# Patient Record
Sex: MALE | Race: BLACK OR AFRICAN AMERICAN | NOT HISPANIC OR LATINO | Employment: OTHER | URBAN - METROPOLITAN AREA
[De-identification: names, ages, dates, MRNs, and addresses within clinical notes are randomized per-mention and may not be internally consistent; named-entity substitution may affect disease eponyms.]

---

## 2017-04-01 ENCOUNTER — LAB VISIT (OUTPATIENT)
Dept: LAB | Facility: HOSPITAL | Age: 52
End: 2017-04-01
Attending: INTERNAL MEDICINE
Payer: COMMERCIAL

## 2017-04-01 DIAGNOSIS — E78.5 HYPERLIPIDEMIA, UNSPECIFIED: ICD-10-CM

## 2017-04-01 DIAGNOSIS — Z11.59 NEED FOR HEPATITIS C SCREENING TEST: ICD-10-CM

## 2017-04-01 DIAGNOSIS — E55.9 VITAMIN D DEFICIENCY: ICD-10-CM

## 2017-04-01 DIAGNOSIS — Z00.00 ROUTINE GENERAL MEDICAL EXAMINATION AT A HEALTH CARE FACILITY: ICD-10-CM

## 2017-04-01 LAB
ALBUMIN SERPL BCP-MCNC: 4.1 G/DL
ALP SERPL-CCNC: 45 U/L
ALT SERPL W/O P-5'-P-CCNC: 14 U/L
ANION GAP SERPL CALC-SCNC: 8 MMOL/L
AST SERPL-CCNC: 18 U/L
BASOPHILS # BLD AUTO: 0.01 K/UL
BASOPHILS NFR BLD: 0.2 %
BILIRUB SERPL-MCNC: 0.7 MG/DL
BUN SERPL-MCNC: 12 MG/DL
CALCIUM SERPL-MCNC: 9.9 MG/DL
CHLORIDE SERPL-SCNC: 104 MMOL/L
CHOLEST/HDLC SERPL: 3.7 {RATIO}
CO2 SERPL-SCNC: 28 MMOL/L
COMPLEXED PSA SERPL-MCNC: 2.7 NG/ML
CREAT SERPL-MCNC: 1.1 MG/DL
DIFFERENTIAL METHOD: ABNORMAL
EOSINOPHIL # BLD AUTO: 0.1 K/UL
EOSINOPHIL NFR BLD: 2 %
ERYTHROCYTE [DISTWIDTH] IN BLOOD BY AUTOMATED COUNT: 13.6 %
EST. GFR  (AFRICAN AMERICAN): >60 ML/MIN/1.73 M^2
EST. GFR  (NON AFRICAN AMERICAN): >60 ML/MIN/1.73 M^2
GLUCOSE SERPL-MCNC: 81 MG/DL
HCT VFR BLD AUTO: 43.1 %
HDL/CHOLESTEROL RATIO: 26.9 %
HDLC SERPL-MCNC: 193 MG/DL
HDLC SERPL-MCNC: 52 MG/DL
HGB BLD-MCNC: 14.4 G/DL
LDLC SERPL CALC-MCNC: 130 MG/DL
LYMPHOCYTES # BLD AUTO: 1.5 K/UL
LYMPHOCYTES NFR BLD: 33.6 %
MCH RBC QN AUTO: 28.7 PG
MCHC RBC AUTO-ENTMCNC: 33.4 %
MCV RBC AUTO: 86 FL
MONOCYTES # BLD AUTO: 0.4 K/UL
MONOCYTES NFR BLD: 9 %
NEUTROPHILS # BLD AUTO: 2.5 K/UL
NEUTROPHILS NFR BLD: 55.2 %
NONHDLC SERPL-MCNC: 141 MG/DL
PLATELET # BLD AUTO: 199 K/UL
PMV BLD AUTO: 9.1 FL
POTASSIUM SERPL-SCNC: 4.9 MMOL/L
PROT SERPL-MCNC: 7.8 G/DL
RBC # BLD AUTO: 5.02 M/UL
SODIUM SERPL-SCNC: 140 MMOL/L
TRIGL SERPL-MCNC: 55 MG/DL
TSH SERPL DL<=0.005 MIU/L-ACNC: 1.56 UIU/ML
WBC # BLD AUTO: 4.46 K/UL

## 2017-04-01 PROCEDURE — 36415 COLL VENOUS BLD VENIPUNCTURE: CPT | Mod: PO

## 2017-04-01 PROCEDURE — 80053 COMPREHEN METABOLIC PANEL: CPT

## 2017-04-01 PROCEDURE — 86803 HEPATITIS C AB TEST: CPT

## 2017-04-01 PROCEDURE — 82306 VITAMIN D 25 HYDROXY: CPT

## 2017-04-01 PROCEDURE — 85025 COMPLETE CBC W/AUTO DIFF WBC: CPT

## 2017-04-01 PROCEDURE — 84443 ASSAY THYROID STIM HORMONE: CPT

## 2017-04-01 PROCEDURE — 80061 LIPID PANEL: CPT

## 2017-04-01 PROCEDURE — 84153 ASSAY OF PSA TOTAL: CPT

## 2017-04-03 LAB — HCV AB SERPL QL IA: NEGATIVE

## 2017-04-04 ENCOUNTER — OFFICE VISIT (OUTPATIENT)
Dept: INTERNAL MEDICINE | Facility: CLINIC | Age: 52
End: 2017-04-04
Payer: COMMERCIAL

## 2017-04-04 VITALS
BODY MASS INDEX: 31.97 KG/M2 | DIASTOLIC BLOOD PRESSURE: 76 MMHG | RESPIRATION RATE: 16 BRPM | TEMPERATURE: 99 F | SYSTOLIC BLOOD PRESSURE: 118 MMHG | HEIGHT: 71 IN | WEIGHT: 228.38 LBS | HEART RATE: 84 BPM

## 2017-04-04 DIAGNOSIS — E55.9 VITAMIN D DEFICIENCY: ICD-10-CM

## 2017-04-04 DIAGNOSIS — R97.20 RISING PSA LEVEL: ICD-10-CM

## 2017-04-04 DIAGNOSIS — E78.5 HYPERLIPIDEMIA, UNSPECIFIED HYPERLIPIDEMIA TYPE: ICD-10-CM

## 2017-04-04 DIAGNOSIS — J31.0 CHRONIC RHINITIS: ICD-10-CM

## 2017-04-04 DIAGNOSIS — Z00.00 WELL ADULT EXAM: Primary | ICD-10-CM

## 2017-04-04 LAB — 25(OH)D3+25(OH)D2 SERPL-MCNC: 35 NG/ML

## 2017-04-04 PROCEDURE — 99999 PR PBB SHADOW E&M-EST. PATIENT-LVL III: CPT | Mod: PBBFAC,,, | Performed by: INTERNAL MEDICINE

## 2017-04-04 PROCEDURE — 99396 PREV VISIT EST AGE 40-64: CPT | Mod: S$GLB,,, | Performed by: INTERNAL MEDICINE

## 2017-04-04 NOTE — PROGRESS NOTES
Subjective:       Patient ID: Linda Mobley is a 52 y.o. male.    Chief Complaint: Annual Exam    HPI   The patient presents for annual physical examination follow-up.  He has dyslipidemia and chronic rhinitis.  He is doing well.  He has not experience any recent episodes of lower back pain.  He is resting well at night.  He has not been exercising regularly.  No exertional chest pain or dyspnea is noted.  Review of Systems   Constitutional: Negative for activity change, appetite change, chills, fatigue, fever and unexpected weight change.   HENT: Negative for congestion, ear pain, nosebleeds and postnasal drip.    Eyes: Negative for pain, redness, itching and visual disturbance.   Respiratory: Negative for cough, chest tightness, shortness of breath and wheezing.    Cardiovascular: Negative for chest pain, palpitations and leg swelling.   Gastrointestinal: Negative for abdominal pain, blood in stool, constipation, nausea and vomiting.   Genitourinary: Positive for frequency (chronic daytime frequency noted.). Negative for difficulty urinating, dysuria, hematuria and urgency.        No nocturia noted.   Musculoskeletal: Negative for arthralgias, back pain, gait problem, joint swelling, myalgias, neck pain and neck stiffness.   Skin: Negative for color change and rash.   Neurological: Negative for dizziness, seizures, syncope, weakness, light-headedness, numbness and headaches.   Hematological: Does not bruise/bleed easily.   Psychiatric/Behavioral: Negative for agitation, confusion, hallucinations and sleep disturbance. The patient is not nervous/anxious.        Objective:      Physical Exam   Constitutional: He is oriented to person, place, and time. He appears well-developed and well-nourished. No distress.   HENT:   Head: Normocephalic and atraumatic.   Right Ear: External ear normal.   Left Ear: External ear normal.   Mouth/Throat: Oropharynx is clear and moist. No oropharyngeal exudate.   Eyes: Conjunctivae  and EOM are normal. Pupils are equal, round, and reactive to light. No scleral icterus.   Neck: Normal range of motion. Neck supple. No JVD present. No thyromegaly present.   Cardiovascular: Normal rate, regular rhythm, normal heart sounds and intact distal pulses.  Exam reveals no gallop and no friction rub.    No murmur heard.  Pulmonary/Chest: Effort normal and breath sounds normal. No respiratory distress. He has no wheezes. He has no rales.   Abdominal: Soft. Bowel sounds are normal. He exhibits no mass. There is no tenderness.   Genitourinary: Penis normal.   Genitourinary Comments:   No inguinal hernia.  No testicular masses.   Musculoskeletal: Normal range of motion. He exhibits no edema or tenderness.   Lymphadenopathy:     He has no cervical adenopathy.   Neurological: He is alert and oriented to person, place, and time. No cranial nerve deficit. He exhibits normal muscle tone.   Skin: Skin is warm and dry. No rash noted.   Psychiatric: He has a normal mood and affect. His behavior is normal.   Nursing note and vitals reviewed.      Results for orders placed or performed in visit on 04/01/17   Urinalysis   Result Value Ref Range    Specimen UA Urine, Clean Catch     Color, UA Yellow Yellow, Straw, Jennifer    Appearance, UA Clear Clear    pH, UA 5.0 5.0 - 8.0    Specific Gravity, UA 1.025 1.005 - 1.030    Protein, UA Negative Negative    Glucose, UA Negative Negative    Ketones, UA Negative Negative    Bilirubin (UA) Negative Negative    Occult Blood UA 1+ (A) Negative    Nitrite, UA Negative Negative    Urobilinogen, UA 2.0-3.0 (A) <2.0 EU/dL    Leukocytes, UA Negative Negative   Urinalysis Microscopic   Result Value Ref Range    RBC, UA 3 0 - 4 /hpf    WBC, UA 5 0 - 5 /hpf    Squam Epithel, UA 0 /hpf    Microscopic Comment SEE COMMENT      Other labs reviewed.  Assessment:       1. Well adult exam    2. Hyperlipidemia, unspecified hyperlipidemia type    3. Chronic rhinitis    4. Vitamin D deficiency    5.  Rising PSA level        Plan:       Linda was seen today for annual exam.  Lipid profile and diagnostic PSA level will be obtained in 5 months for monitoring.  The patient has been encouraged to decrease fat intake in the diet.  The patient is to return to clinic annually and as needed.    Diagnoses and all orders for this visit:    Well adult exam    Hyperlipidemia, unspecified hyperlipidemia type    Chronic rhinitis    Vitamin D deficiency

## 2017-09-01 ENCOUNTER — LAB VISIT (OUTPATIENT)
Dept: LAB | Facility: HOSPITAL | Age: 52
End: 2017-09-01
Attending: INTERNAL MEDICINE
Payer: COMMERCIAL

## 2017-09-01 DIAGNOSIS — R97.20 RISING PSA LEVEL: ICD-10-CM

## 2017-09-01 DIAGNOSIS — E78.5 HYPERLIPIDEMIA, UNSPECIFIED HYPERLIPIDEMIA TYPE: ICD-10-CM

## 2017-09-01 LAB
CHOLEST SERPL-MCNC: 176 MG/DL
CHOLEST/HDLC SERPL: 3.6 {RATIO}
COMPLEXED PSA SERPL-MCNC: 2 NG/ML
HDLC SERPL-MCNC: 49 MG/DL
HDLC SERPL: 27.8 %
LDLC SERPL CALC-MCNC: 116.8 MG/DL
NONHDLC SERPL-MCNC: 127 MG/DL
TRIGL SERPL-MCNC: 51 MG/DL

## 2017-09-01 PROCEDURE — 84153 ASSAY OF PSA TOTAL: CPT

## 2017-09-01 PROCEDURE — 80061 LIPID PANEL: CPT

## 2017-09-01 PROCEDURE — 36415 COLL VENOUS BLD VENIPUNCTURE: CPT | Mod: PO

## 2017-09-04 ENCOUNTER — PATIENT MESSAGE (OUTPATIENT)
Dept: INTERNAL MEDICINE | Facility: CLINIC | Age: 52
End: 2017-09-04

## 2018-05-07 ENCOUNTER — TELEPHONE (OUTPATIENT)
Dept: INTERNAL MEDICINE | Facility: CLINIC | Age: 53
End: 2018-05-07

## 2018-05-07 DIAGNOSIS — Z00.00 ROUTINE GENERAL MEDICAL EXAMINATION AT A HEALTH CARE FACILITY: Primary | ICD-10-CM

## 2018-05-07 NOTE — TELEPHONE ENCOUNTER
----- Message from Jayne Mcknight sent at 5/7/2018 10:44 AM CDT -----  Contact: Pt [phone]  Doctor appointment and lab have been scheduled.  Please link lab orders to the lab appointment.  Date of doctor appointment:  05/31/2018  Physical or EP:  Ep   Date of lab appointment:  05/26/2018  Comments:

## 2018-05-12 ENCOUNTER — LAB VISIT (OUTPATIENT)
Dept: LAB | Facility: HOSPITAL | Age: 53
End: 2018-05-12
Attending: INTERNAL MEDICINE
Payer: COMMERCIAL

## 2018-05-12 DIAGNOSIS — Z00.00 ROUTINE GENERAL MEDICAL EXAMINATION AT A HEALTH CARE FACILITY: ICD-10-CM

## 2018-05-12 LAB
25(OH)D3+25(OH)D2 SERPL-MCNC: 37 NG/ML
ALBUMIN SERPL BCP-MCNC: 4.1 G/DL
ALP SERPL-CCNC: 42 U/L
ALT SERPL W/O P-5'-P-CCNC: 14 U/L
ANION GAP SERPL CALC-SCNC: 10 MMOL/L
AST SERPL-CCNC: 16 U/L
BASOPHILS # BLD AUTO: 0.02 K/UL
BASOPHILS NFR BLD: 0.5 %
BILIRUB SERPL-MCNC: 0.6 MG/DL
BUN SERPL-MCNC: 12 MG/DL
CALCIUM SERPL-MCNC: 9.6 MG/DL
CHLORIDE SERPL-SCNC: 105 MMOL/L
CHOLEST SERPL-MCNC: 194 MG/DL
CHOLEST/HDLC SERPL: 3.8 {RATIO}
CO2 SERPL-SCNC: 22 MMOL/L
COMPLEXED PSA SERPL-MCNC: 1.8 NG/ML
CREAT SERPL-MCNC: 1.1 MG/DL
DIFFERENTIAL METHOD: ABNORMAL
EOSINOPHIL # BLD AUTO: 0.1 K/UL
EOSINOPHIL NFR BLD: 2.4 %
ERYTHROCYTE [DISTWIDTH] IN BLOOD BY AUTOMATED COUNT: 13.1 %
EST. GFR  (AFRICAN AMERICAN): >60 ML/MIN/1.73 M^2
EST. GFR  (NON AFRICAN AMERICAN): >60 ML/MIN/1.73 M^2
GLUCOSE SERPL-MCNC: 93 MG/DL
HCT VFR BLD AUTO: 42.5 %
HDLC SERPL-MCNC: 51 MG/DL
HDLC SERPL: 26.3 %
HGB BLD-MCNC: 13.9 G/DL
IMM GRANULOCYTES # BLD AUTO: 0.01 K/UL
IMM GRANULOCYTES NFR BLD AUTO: 0.2 %
LDLC SERPL CALC-MCNC: 131.4 MG/DL
LYMPHOCYTES # BLD AUTO: 1.6 K/UL
LYMPHOCYTES NFR BLD: 38.2 %
MCH RBC QN AUTO: 29.1 PG
MCHC RBC AUTO-ENTMCNC: 32.7 G/DL
MCV RBC AUTO: 89 FL
MONOCYTES # BLD AUTO: 0.4 K/UL
MONOCYTES NFR BLD: 9.7 %
NEUTROPHILS # BLD AUTO: 2.1 K/UL
NEUTROPHILS NFR BLD: 49 %
NONHDLC SERPL-MCNC: 143 MG/DL
NRBC BLD-RTO: 0 /100 WBC
PLATELET # BLD AUTO: 222 K/UL
PMV BLD AUTO: 9.2 FL
POTASSIUM SERPL-SCNC: 4.2 MMOL/L
PROT SERPL-MCNC: 7.7 G/DL
RBC # BLD AUTO: 4.78 M/UL
SODIUM SERPL-SCNC: 137 MMOL/L
TRIGL SERPL-MCNC: 58 MG/DL
TSH SERPL DL<=0.005 MIU/L-ACNC: 1.45 UIU/ML
WBC # BLD AUTO: 4.24 K/UL

## 2018-05-12 PROCEDURE — 85025 COMPLETE CBC W/AUTO DIFF WBC: CPT

## 2018-05-12 PROCEDURE — 84443 ASSAY THYROID STIM HORMONE: CPT

## 2018-05-12 PROCEDURE — 36415 COLL VENOUS BLD VENIPUNCTURE: CPT | Mod: PO

## 2018-05-12 PROCEDURE — 84153 ASSAY OF PSA TOTAL: CPT

## 2018-05-12 PROCEDURE — 80061 LIPID PANEL: CPT

## 2018-05-12 PROCEDURE — 80053 COMPREHEN METABOLIC PANEL: CPT

## 2018-05-12 PROCEDURE — 82306 VITAMIN D 25 HYDROXY: CPT

## 2018-05-31 ENCOUNTER — OFFICE VISIT (OUTPATIENT)
Dept: INTERNAL MEDICINE | Facility: CLINIC | Age: 53
End: 2018-05-31
Payer: COMMERCIAL

## 2018-05-31 VITALS
HEART RATE: 84 BPM | RESPIRATION RATE: 16 BRPM | BODY MASS INDEX: 31.64 KG/M2 | SYSTOLIC BLOOD PRESSURE: 122 MMHG | DIASTOLIC BLOOD PRESSURE: 74 MMHG | WEIGHT: 226 LBS | TEMPERATURE: 98 F | HEIGHT: 71 IN

## 2018-05-31 DIAGNOSIS — E78.5 HYPERLIPIDEMIA, UNSPECIFIED HYPERLIPIDEMIA TYPE: ICD-10-CM

## 2018-05-31 DIAGNOSIS — Z12.11 ENCOUNTER FOR SCREENING COLONOSCOPY: ICD-10-CM

## 2018-05-31 DIAGNOSIS — J31.0 CHRONIC RHINITIS: ICD-10-CM

## 2018-05-31 DIAGNOSIS — Z00.00 WELL ADULT EXAM: Primary | ICD-10-CM

## 2018-05-31 PROCEDURE — 99396 PREV VISIT EST AGE 40-64: CPT | Mod: S$GLB,,, | Performed by: INTERNAL MEDICINE

## 2018-05-31 PROCEDURE — 99999 PR PBB SHADOW E&M-EST. PATIENT-LVL III: CPT | Mod: PBBFAC,,, | Performed by: INTERNAL MEDICINE

## 2018-05-31 NOTE — PROGRESS NOTES
Subjective:       Patient ID: Linda Mobley is a 53 y.o. male.    Chief Complaint: Annual Exam    HPI   The patient presents for annual physical examination.  He has been feeling well.  He has chronic rhinitis and hyperlipidemia.  He has not experienced any exacerbations sinus congestion and postnasal drainage.  No respiratory infections are noted.  Occasional mild tinnitus is noted.  There is no loss of balance or decreased hearing acuity noted.  The patient remains physically active.    The patient is due for an Adult Tdap.  We discussed obtaining the shingles vaccine.  The patient is also due for colonoscopy.  He has a history of colon polyps.  His last examination was in 2013. He remains asymptomatic.  Review of Systems   Constitutional: Negative for activity change, appetite change, chills, fatigue, fever and unexpected weight change.   HENT: Negative for congestion, ear pain, nosebleeds and postnasal drip.    Eyes: Negative for pain, redness, itching and visual disturbance.   Respiratory: Negative for cough, chest tightness, shortness of breath and wheezing.    Cardiovascular: Negative for chest pain, palpitations and leg swelling.   Gastrointestinal: Negative for abdominal pain, blood in stool, constipation, nausea and vomiting.   Genitourinary: Negative for difficulty urinating, dysuria, frequency, hematuria and urgency.   Musculoskeletal: Negative for arthralgias, back pain, gait problem, joint swelling, myalgias, neck pain and neck stiffness.   Skin: Negative for color change and rash.   Neurological: Negative for dizziness, seizures, syncope, weakness, light-headedness, numbness and headaches.   Hematological: Does not bruise/bleed easily.   Psychiatric/Behavioral: Negative for agitation, confusion, hallucinations and sleep disturbance. The patient is not nervous/anxious.        Objective:      Physical Exam   Constitutional: He is oriented to person, place, and time. He appears well-developed and  well-nourished. No distress.   HENT:   Head: Normocephalic and atraumatic.   Right Ear: External ear normal.   Left Ear: External ear normal.   Mouth/Throat: Oropharynx is clear and moist. No oropharyngeal exudate.   Eyes: Conjunctivae and EOM are normal. Pupils are equal, round, and reactive to light. No scleral icterus.   Neck: Normal range of motion. Neck supple. No JVD present. No thyromegaly present.   Cardiovascular: Normal rate, regular rhythm, normal heart sounds and intact distal pulses.  Exam reveals no gallop and no friction rub.    No murmur heard.  Pulmonary/Chest: Effort normal and breath sounds normal. No respiratory distress. He has no wheezes. He has no rales.   Abdominal: Soft. Bowel sounds are normal. He exhibits no mass. There is no tenderness.   Genitourinary: Penis normal.   Genitourinary Comments:   No inguinal hernia.  No testicular masses.   Musculoskeletal: Normal range of motion. He exhibits no edema or tenderness.   Lymphadenopathy:     He has no cervical adenopathy.   Neurological: He is alert and oriented to person, place, and time. No cranial nerve deficit. He exhibits normal muscle tone.   Skin: Skin is warm and dry. No rash noted.   Psychiatric: He has a normal mood and affect. His behavior is normal.   Nursing note and vitals reviewed.      Results for orders placed or performed in visit on 05/12/18   CBC auto differential   Result Value Ref Range    WBC 4.24 3.90 - 12.70 K/uL    RBC 4.78 4.60 - 6.20 M/uL    Hemoglobin 13.9 (L) 14.0 - 18.0 g/dL    Hematocrit 42.5 40.0 - 54.0 %    MCV 89 82 - 98 fL    MCH 29.1 27.0 - 31.0 pg    MCHC 32.7 32.0 - 36.0 g/dL    RDW 13.1 11.5 - 14.5 %    Platelets 222 150 - 350 K/uL    MPV 9.2 9.2 - 12.9 fL    Immature Granulocytes 0.2 0.0 - 0.5 %    Gran # (ANC) 2.1 1.8 - 7.7 K/uL    Immature Grans (Abs) 0.01 0.00 - 0.04 K/uL    Lymph # 1.6 1.0 - 4.8 K/uL    Mono # 0.4 0.3 - 1.0 K/uL    Eos # 0.1 0.0 - 0.5 K/uL    Baso # 0.02 0.00 - 0.20 K/uL    nRBC 0 0  /100 WBC    Gran% 49.0 38.0 - 73.0 %    Lymph% 38.2 18.0 - 48.0 %    Mono% 9.7 4.0 - 15.0 %    Eosinophil% 2.4 0.0 - 8.0 %    Basophil% 0.5 0.0 - 1.9 %    Differential Method Automated    Comprehensive metabolic panel   Result Value Ref Range    Sodium 137 136 - 145 mmol/L    Potassium 4.2 3.5 - 5.1 mmol/L    Chloride 105 95 - 110 mmol/L    CO2 22 (L) 23 - 29 mmol/L    Glucose 93 70 - 110 mg/dL    BUN, Bld 12 6 - 20 mg/dL    Creatinine 1.1 0.5 - 1.4 mg/dL    Calcium 9.6 8.7 - 10.5 mg/dL    Total Protein 7.7 6.0 - 8.4 g/dL    Albumin 4.1 3.5 - 5.2 g/dL    Total Bilirubin 0.6 0.1 - 1.0 mg/dL    Alkaline Phosphatase 42 (L) 55 - 135 U/L    AST 16 10 - 40 U/L    ALT 14 10 - 44 U/L    Anion Gap 10 8 - 16 mmol/L    eGFR if African American >60.0 >60 mL/min/1.73 m^2    eGFR if non African American >60.0 >60 mL/min/1.73 m^2   Lipid panel   Result Value Ref Range    Cholesterol 194 120 - 199 mg/dL    Triglycerides 58 30 - 150 mg/dL    HDL 51 40 - 75 mg/dL    LDL Cholesterol 131.4 63.0 - 159.0 mg/dL    HDL/Chol Ratio 26.3 20.0 - 50.0 %    Total Cholesterol/HDL Ratio 3.8 2.0 - 5.0    Non-HDL Cholesterol 143 mg/dL   TSH   Result Value Ref Range    TSH 1.447 0.400 - 4.000 uIU/mL   PSA, Screening   Result Value Ref Range    PSA, SCREEN 1.8 0.00 - 4.00 ng/mL   Vitamin D   Result Value Ref Range    Vit D, 25-Hydroxy 37 30 - 96 ng/mL       Assessment:       1. Well adult exam    2. Hyperlipidemia, unspecified hyperlipidemia type    3. Chronic rhinitis        Plan:           Linda was seen today for annual exam.  Prescriptions for the Shingrix shingles vaccine and an adult Tdap were given to the patient.  A screening colonoscopy will be ordered.  The patient is to return to clinic annually and as needed.    Diagnoses and all orders for this visit:    Well adult exam    Hyperlipidemia, unspecified hyperlipidemia type    Chronic rhinitis    Encounter for screening colonoscopy  -     Case request GI: COLONOSCOPY    Other orders  -      DIPH,PERTUSS,ACEL,,TET VAC,PF,, ADULT, (ADACEL,TDAP ADOLESN/ADULT,,PF,) 2 Lf-(2.5-5-3-5 mcg)-5Lf/0.5 mL injection; Inject 0.5 mLs into the muscle once. (To Pharmacist)  -     varicella-zoster [id number], PF, (SHINGRIX, PF,) 50 mcg/0.5 mL injection; Inject 0.5 mLs into the muscle once.

## 2018-06-12 ENCOUNTER — PATIENT MESSAGE (OUTPATIENT)
Dept: INTERNAL MEDICINE | Facility: CLINIC | Age: 53
End: 2018-06-12

## 2018-06-18 ENCOUNTER — TELEPHONE (OUTPATIENT)
Dept: INTERNAL MEDICINE | Facility: CLINIC | Age: 53
End: 2018-06-18

## 2018-06-18 DIAGNOSIS — Z12.11 SPECIAL SCREENING FOR MALIGNANT NEOPLASMS, COLON: Primary | ICD-10-CM

## 2018-06-18 RX ORDER — POLYETHYLENE GLYCOL 3350, SODIUM SULFATE ANHYDROUS, SODIUM BICARBONATE, SODIUM CHLORIDE, POTASSIUM CHLORIDE 236; 22.74; 6.74; 5.86; 2.97 G/4L; G/4L; G/4L; G/4L; G/4L
4 POWDER, FOR SOLUTION ORAL ONCE
Qty: 4000 ML | Refills: 0 | Status: SHIPPED | OUTPATIENT
Start: 2018-06-18 | End: 2018-06-18

## 2018-06-18 NOTE — TELEPHONE ENCOUNTER
----- Message from Skylar Blum sent at 6/18/2018 11:51 AM CDT -----  Contact: Pt  Pt is calling to schedule colonoscopy and can be reached at [phone].    Thank you

## 2018-06-18 NOTE — TELEPHONE ENCOUNTER
----- Message from Skylar Blum sent at 6/18/2018 11:51 AM CDT -----  Contact: Pt  Pt is calling to schedule colonoscopy and can be reached at [phone].    Thank you

## 2018-08-31 ENCOUNTER — ANESTHESIA (OUTPATIENT)
Dept: ENDOSCOPY | Facility: HOSPITAL | Age: 53
End: 2018-08-31
Payer: COMMERCIAL

## 2018-08-31 ENCOUNTER — ANESTHESIA EVENT (OUTPATIENT)
Dept: ENDOSCOPY | Facility: HOSPITAL | Age: 53
End: 2018-08-31
Payer: COMMERCIAL

## 2018-08-31 ENCOUNTER — HOSPITAL ENCOUNTER (OUTPATIENT)
Facility: HOSPITAL | Age: 53
Discharge: HOME | End: 2018-08-31
Attending: COLON & RECTAL SURGERY | Admitting: COLON & RECTAL SURGERY
Payer: COMMERCIAL

## 2018-08-31 VITALS
RESPIRATION RATE: 18 BRPM | HEIGHT: 70 IN | OXYGEN SATURATION: 100 % | DIASTOLIC BLOOD PRESSURE: 81 MMHG | HEART RATE: 67 BPM | BODY MASS INDEX: 30.92 KG/M2 | SYSTOLIC BLOOD PRESSURE: 124 MMHG | TEMPERATURE: 97 F | WEIGHT: 216 LBS

## 2018-08-31 DIAGNOSIS — Z12.11 SCREENING FOR COLON CANCER: ICD-10-CM

## 2018-08-31 PROCEDURE — 88305 TISSUE EXAM BY PATHOLOGIST: CPT | Mod: 26,,, | Performed by: PATHOLOGY

## 2018-08-31 PROCEDURE — E9220 PRA ENDO ANESTHESIA: HCPCS | Mod: 33,,, | Performed by: NURSE ANESTHETIST, CERTIFIED REGISTERED

## 2018-08-31 PROCEDURE — 63600175 PHARM REV CODE 636 W HCPCS: Performed by: NURSE ANESTHETIST, CERTIFIED REGISTERED

## 2018-08-31 PROCEDURE — 25000003 PHARM REV CODE 250: Performed by: NURSE PRACTITIONER

## 2018-08-31 PROCEDURE — 45380 COLONOSCOPY AND BIOPSY: CPT | Mod: 33,,, | Performed by: COLON & RECTAL SURGERY

## 2018-08-31 PROCEDURE — 45380 COLONOSCOPY AND BIOPSY: CPT | Performed by: COLON & RECTAL SURGERY

## 2018-08-31 PROCEDURE — 37000009 HC ANESTHESIA EA ADD 15 MINS: Performed by: COLON & RECTAL SURGERY

## 2018-08-31 PROCEDURE — 37000008 HC ANESTHESIA 1ST 15 MINUTES: Performed by: COLON & RECTAL SURGERY

## 2018-08-31 PROCEDURE — 88305 TISSUE EXAM BY PATHOLOGIST: CPT | Performed by: PATHOLOGY

## 2018-08-31 PROCEDURE — 27201012 HC FORCEPS, HOT/COLD, DISP: Performed by: COLON & RECTAL SURGERY

## 2018-08-31 RX ORDER — LIDOCAINE HCL/PF 100 MG/5ML
SYRINGE (ML) INTRAVENOUS
Status: DISCONTINUED | OUTPATIENT
Start: 2018-08-31 | End: 2018-08-31

## 2018-08-31 RX ORDER — SODIUM CHLORIDE 0.9 % (FLUSH) 0.9 %
3 SYRINGE (ML) INJECTION
Status: DISCONTINUED | OUTPATIENT
Start: 2018-08-31 | End: 2018-08-31 | Stop reason: HOSPADM

## 2018-08-31 RX ORDER — SODIUM CHLORIDE 9 MG/ML
INJECTION, SOLUTION INTRAVENOUS CONTINUOUS
Status: DISCONTINUED | OUTPATIENT
Start: 2018-08-31 | End: 2018-08-31 | Stop reason: HOSPADM

## 2018-08-31 RX ORDER — PROPOFOL 10 MG/ML
VIAL (ML) INTRAVENOUS CONTINUOUS PRN
Status: DISCONTINUED | OUTPATIENT
Start: 2018-08-31 | End: 2018-08-31

## 2018-08-31 RX ORDER — PROPOFOL 10 MG/ML
VIAL (ML) INTRAVENOUS
Status: DISCONTINUED | OUTPATIENT
Start: 2018-08-31 | End: 2018-08-31

## 2018-08-31 RX ADMIN — PROPOFOL 250 MCG/KG/MIN: 10 INJECTION, EMULSION INTRAVENOUS at 07:08

## 2018-08-31 RX ADMIN — LIDOCAINE HYDROCHLORIDE 50 MG: 20 INJECTION, SOLUTION INTRAVENOUS at 07:08

## 2018-08-31 RX ADMIN — PROPOFOL 100 MG: 10 INJECTION, EMULSION INTRAVENOUS at 07:08

## 2018-08-31 RX ADMIN — SODIUM CHLORIDE: 0.9 INJECTION, SOLUTION INTRAVENOUS at 07:08

## 2018-08-31 NOTE — PLAN OF CARE
Pt verbalized understanding of discharge instructions including diet, s/s to notify md, and follow up. Pt refused wheelchair and will ambulate out of the unit with spouse.

## 2018-08-31 NOTE — ANESTHESIA POSTPROCEDURE EVALUATION
"Anesthesia Post Evaluation    Patient: Linda Mobley    Procedure(s) Performed: Procedure(s) (LRB):  COLONOSCOPY (N/A)    Final Anesthesia Type: general  Patient location during evaluation: PACU  Patient participation: Yes- Able to Participate  Level of consciousness: awake and alert  Post-procedure vital signs: reviewed and stable  Pain management: adequate  Airway patency: patent  PONV status at discharge: No PONV  Anesthetic complications: no      Cardiovascular status: hemodynamically stable  Respiratory status: unassisted  Hydration status: euvolemic  Follow-up not needed.        Visit Vitals  /81   Pulse 67   Temp 36.2 °C (97.2 °F) (Temporal)   Resp 18   Ht 5' 10" (1.778 m)   Wt 98 kg (216 lb)   SpO2 100%   BMI 30.99 kg/m²       Pain/Devaughn Score: Pain Assessment Performed: Yes (8/31/2018  8:29 AM)  Presence of Pain: denies (8/31/2018  8:44 AM)  Pain Rating Prior to Med Admin: 0 (8/31/2018  7:15 AM)  Devaughn Score: 10 (8/31/2018  8:29 AM)        "

## 2018-08-31 NOTE — TRANSFER OF CARE
"Anesthesia Transfer of Care Note    Patient: Linda Mobley    Procedure(s) Performed: Procedure(s) (LRB):  COLONOSCOPY (N/A)    Patient location: PACU    Anesthesia Type: general    Transport from OR: Transported from OR on room air with adequate spontaneous ventilation    Post pain: adequate analgesia    Post assessment: no apparent anesthetic complications    Post vital signs: stable    Level of consciousness: sedated    Nausea/Vomiting: no nausea/vomiting    Complications: none    Transfer of care protocol was followed      Last vitals:   Visit Vitals  /64 (BP Location: Left arm, Patient Position: Lying)   Pulse 77   Temp 36.2 °C (97.2 °F) (Temporal)   Resp 18   Ht 5' 10" (1.778 m)   Wt 98 kg (216 lb)   SpO2 96%   BMI 30.99 kg/m²     "

## 2018-08-31 NOTE — [employer] PATIENT INSTRUCTIONS
Discharge Summary/Instructions after an Endoscopic Procedure  Patient Name: Linda Mobley  Patient MRN: [id number]  Patient YOB: 1965 Friday, August 31, 2018  Pablo Sanders MD  RESTRICTIONS:  During your procedure today, you received medications for sedation.  These   medications may affect your judgment, balance and coordination.  Therefore,   for 24 hours, you have the following restrictions:   - DO NOT drive a car, operate machinery, make legal/financial decisions,   sign important papers or drink alcohol.    ACTIVITY:  Today: no heavy lifting, straining or running due to procedural   sedation/anesthesia.  The following day: return to full activity including work.  DIET:  Eat and drink normally unless instructed otherwise.     TREATMENT FOR COMMON SIDE EFFECTS:  - Mild abdominal pain, nausea, belching, bloating or excessive gas:  rest,   eat lightly and use a heating pad.  - Sore Throat: treat with throat lozenges and/or gargle with warm salt   water.  - Because air was used during the procedure, expelling large amounts of air   from your rectum or belching is normal.  - If a bowel prep was taken, you may not have a bowel movement for 1-3 days.    This is normal.  SYMPTOMS TO WATCH FOR AND REPORT TO YOUR PHYSICIAN:  1. Abdominal pain or bloating, other than gas cramps.  2. Chest pain.  3. Back pain.  4. Signs of infection such as: chills or fever occurring within 24 hours   after the procedure.  5. Rectal bleeding, which would show as bright red, maroon, or black stools.   (A tablespoon of blood from the rectum is not serious, especially if   hemorrhoids are present.)  6. Vomiting.  7. Weakness or dizziness.  GO DIRECTLY TO THE NEAREST EMERGENCY ROOM IF YOU HAVE ANY OF THE FOLLOWING:      Difficulty breathing              Chills and/or fever over 101 F   Persistent vomiting and/or vomiting blood   Severe abdominal pain   Severe chest pain   Black, tarry stools   Bleeding- more than one  tablespoon   Any other symptom or condition that you feel may need urgent attention  Your doctor recommends these additional instructions:  If any biopsies were taken, your doctors clinic will contact you in 1 to 2   weeks with any results.  - Discharge patient to home.   - High fiber diet.   - Continue present medications.   - Await pathology results.   - Repeat colonoscopy in 5 years for screening purposes.   - Patient has a contact number available for emergencies.  The signs and   symptoms of potential delayed complications were discussed with the   patient.  Return to normal activities tomorrow.  Written discharge   instructions were provided to the patient.  For questions, problems or results please call your physician - Pablo Sanders MD at Work:  [phone].  [facility], EMERGENCY ROOM PHONE NUMBER: [phone]  IF A COMPLICATION OR EMERGENCY SITUATION ARISES AND YOU ARE UNABLE TO REACH   YOUR PHYSICIAN - GO DIRECTLY TO THE EMERGENCY ROOM.  Pablo Sanders MD  8/31/2018 8:08:52 AM  This report has been verified and signed electronically.  [employer]

## 2018-08-31 NOTE — H&P
Procedure : Colonoscopy    Indication(s):  personal history of colon polyps    Review of patient's allergies indicates:   Allergen Reactions    Pcn [penicillins] Hives       Past Medical History:   Diagnosis Date    Chronic rhinitis     Hyperlipidemia     Obesity     Unspecified vitamin D deficiency        Prior to Admission medications    Medication Sig Start Date End Date Taking? Authorizing Provider   aspirin (ECOTRIN) 81 MG EC tablet Take 81 mg by mouth once daily.   Yes Historical Provider, MD   efinaconazole (JUBLIA) 10 % [place] Apply 1 application topically once daily. 3/31/16  Yes Flavio Colunga MD   [id number]-[id number]-[id number] (ADELITA MULTIVITAMIN FOR MEN) 200-175-250 mcg Tab Take 1 tablet by mouth once daily. 3/30/15  Yes Flavio Colunga MD       Sedation Problems: NO    Family History   Problem Relation Age of Onset    Prostate cancer Father     Hypertension Father     Cancer Father 55        prostate    Breast cancer Mother     Diabetes Paternal Uncle     Cancer Paternal Grandfather         prostate       Fam Hx of Sedation Problems: NO    Social History     Socioeconomic History    Marital status:      Spouse name: Not on file    Number of children: Not on file    Years of education: Not on file    Highest education level: Not on file   Social Needs    Financial resource strain: Not on file    Food insecurity - worry: Not on file    Food insecurity - inability: Not on file    Transportation needs - medical: Not on file    Transportation needs - non-medical: Not on file   Occupational History    Occupation:    Tobacco Use    Smoking status: Current Some Day Smoker     Types: Cigars    Smokeless tobacco: Never Used    Tobacco comment: occasional cigar use   Substance and Sexual Activity    Alcohol use: No     Alcohol/week: 0.0 oz    Drug use: No    Sexual activity: Yes     Partners: Female     Birth control/protection: None   Other Topics Concern    Not on  file   Social History Narrative    Not on file       Review of Systems -     Respiratory ROS: no cough, shortness of breath, or wheezing  Cardiovascular ROS: no chest pain or dyspnea on exertion  Gastrointestinal ROS: no abdominal pain, change in bowel habits, or black or bloody stools  Musculoskeletal ROS: negative  Neurological ROS: no TIA or stroke symptoms        Physical Exam:  General: no distress  Head: normocephalic  Airway:  normal oropharynx, airway normal  Neck: supple, symmetrical, trachea midline  Lungs:  normal respiratory effort  Heart: regular rate and rhythm  Abdomen: soft, non-tender non-distented; bowel sounds normal; no masses,  no organomegaly  Extremities: no cyanosis or edema, or clubbing       Deep Sedation: Mallampati Score per anesthesia     SedationPlan :Moderate     ASA : II    Patient is medically cleared for anesthesia.    Anesthesia/Surgery risks, benefits and alternative options discussed and understood by patient/family.

## 2018-08-31 NOTE — ANESTHESIA PREPROCEDURE EVALUATION
08/31/2018  Linda Mobley is a 53 y.o., male.    Anesthesia Evaluation    I have reviewed the Patient Summary Reports.     I have reviewed the Medications.     Review of Systems  Anesthesia Hx:  No problems with previous Anesthesia  Denies Family Hx of Anesthesia complications.   Denies Personal Hx of Anesthesia complications.   Social:  Smoker    Hematology/Oncology:  Hematology Normal   Oncology Normal     EENT/Dental:EENT/Dental Normal   Cardiovascular:  Cardiovascular Normal Exercise tolerance: good     Pulmonary:  Pulmonary Normal    Renal/:  Renal/ Normal     Hepatic/GI:  Hepatic/GI Normal    Musculoskeletal:  Musculoskeletal Normal    Neurological:  Neurology Normal    Endocrine:  Endocrine Normal    Dermatological:  Skin Normal    Psych:  Psychiatric Normal           Physical Exam  General:  Well nourished    Airway/Jaw/Neck:  Airway Findings: Mouth Opening: Normal Tongue: Normal  General Airway Assessment: Adult  Mallampati: II  TM Distance: Normal, at least 6 cm       Chest/Lungs:  Chest/Lungs Clear    Heart/Vascular:  Heart Findings: Normal Heart murmur: negative            Anesthesia Plan  Type of Anesthesia, risks & benefits discussed:  Anesthesia Type:  general  Patient's Preference: general  Intra-op Monitoring Plan: standard ASA monitors  Intra-op Monitoring Plan Comments:   Post Op Pain Control Plan:   Post Op Pain Control Plan Comments:   Induction:   IV  Beta Blocker:  Patient is not currently on a Beta-Blocker (No further documentation required).       Informed Consent: Patient understands risks and agrees with Anesthesia plan.  Questions answered. Anesthesia consent signed with patient.  ASA Score: 2     Day of Surgery Review of History & Physical: I have interviewed and examined the patient. I have reviewed the patient's H&P dated:    H&P update referred to the provider.          Ready For Surgery From Anesthesia Perspective.

## 2018-09-07 ENCOUNTER — TELEPHONE (OUTPATIENT)
Dept: ENDOSCOPY | Facility: HOSPITAL | Age: 53
End: 2018-09-07

## 2019-01-02 ENCOUNTER — OFFICE VISIT (OUTPATIENT)
Dept: INTERNAL MEDICINE | Facility: CLINIC | Age: 54
End: 2019-01-02
Payer: COMMERCIAL

## 2019-01-02 ENCOUNTER — HOSPITAL ENCOUNTER (OUTPATIENT)
Dept: RADIOLOGY | Facility: HOSPITAL | Age: 54
Discharge: HOME | End: 2019-01-02
Attending: FAMILY MEDICINE

## 2019-01-02 VITALS
WEIGHT: 218.69 LBS | HEIGHT: 70 IN | BODY MASS INDEX: 31.31 KG/M2 | HEART RATE: 70 BPM | SYSTOLIC BLOOD PRESSURE: 128 MMHG | DIASTOLIC BLOOD PRESSURE: 80 MMHG | OXYGEN SATURATION: 97 % | TEMPERATURE: 99 F

## 2019-01-02 DIAGNOSIS — E66.9 OBESITY, UNSPECIFIED CLASSIFICATION, UNSPECIFIED OBESITY TYPE, UNSPECIFIED WHETHER SERIOUS COMORBIDITY PRESENT: ICD-10-CM

## 2019-01-02 DIAGNOSIS — M54.50 ACUTE LOW BACK PAIN WITHOUT SCIATICA, UNSPECIFIED BACK PAIN LATERALITY: Primary | ICD-10-CM

## 2019-01-02 DIAGNOSIS — E55.9 VITAMIN D DEFICIENCY: ICD-10-CM

## 2019-01-02 DIAGNOSIS — M54.50 ACUTE LOW BACK PAIN WITHOUT SCIATICA, UNSPECIFIED BACK PAIN LATERALITY: ICD-10-CM
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
## 2019-01-02 DIAGNOSIS — E78.5 HYPERLIPIDEMIA, UNSPECIFIED HYPERLIPIDEMIA TYPE: ICD-10-CM

## 2019-01-02 PROBLEM — Z12.11 SCREENING FOR COLON CANCER: Status: RESOLVED | Noted: 2018-08-31 | Resolved: 2019-01-02

## 2019-01-02 PROCEDURE — 72110 X-RAY EXAM L-2 SPINE 4/>VWS: CPT | Mod: TC,PO

## 2019-01-02 PROCEDURE — 99214 PR OFFICE/OUTPT VISIT, EST, LEVL IV, 30-39 MIN: ICD-10-PCS | Mod: S$PBB,,, | Performed by: FAMILY MEDICINE

## 2019-01-02 PROCEDURE — 72110 X-RAY EXAM L-2 SPINE 4/>VWS: CPT | Mod: 26,,, | Performed by: RADIOLOGY

## 2019-01-02 PROCEDURE — 99999 PR PBB SHADOW E&M-EST. PATIENT-LVL III: ICD-10-PCS | Mod: PBBFAC,,, | Performed by: FAMILY MEDICINE

## 2019-01-02 PROCEDURE — 72110 XR LUMBAR SPINE 5 VIEW WITH FLEX AND EXT: ICD-10-PCS | Mod: 26,,, | Performed by: RADIOLOGY

## 2019-01-02 PROCEDURE — 99999 PR PBB SHADOW E&M-EST. PATIENT-LVL III: CPT | Mod: PBBFAC,,, | Performed by: FAMILY MEDICINE

## 2019-01-02 PROCEDURE — 99214 OFFICE O/P EST MOD 30 MIN: CPT | Mod: S$PBB,,, | Performed by: FAMILY MEDICINE

## 2019-01-02 RX ORDER — METHOCARBAMOL 500 MG/1
500 TABLET, FILM COATED ORAL 3 TIMES DAILY
Qty: 30 TABLET | Refills: 0 | Status: SHIPPED | OUTPATIENT
Start: 2019-01-02 | End: 2021-06-07

## 2019-01-02 RX ORDER — MELOXICAM 15 MG/1
15 TABLET ORAL DAILY
Qty: 15 TABLET | Refills: 0 | Status: SHIPPED | OUTPATIENT
Start: 2019-01-02 | End: 2019-06-01 | Stop reason: ALTCHOICE
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
## 2019-05-14 ENCOUNTER — TELEPHONE (OUTPATIENT)
Dept: INTERNAL MEDICINE | Facility: CLINIC | Age: 54
End: 2019-05-14

## 2019-05-14 DIAGNOSIS — Z00.00 ROUTINE GENERAL MEDICAL EXAMINATION AT A HEALTH CARE FACILITY: Primary | ICD-10-CM

## 2019-05-14 NOTE — TELEPHONE ENCOUNTER
----- Message from Ann Gonzalez sent at 5/14/2019 10:31 AM CDT -----  Contact: Patient [phone]  Doctor appointment and lab have been scheduled.  Please link lab orders to the lab appointment.  Date of doctor appointment:  06/04/19  Date of lab appointment:  06/01/19  Physical or EP: Physical.      Thanks

## 2019-06-01 ENCOUNTER — OFFICE VISIT (OUTPATIENT)
Dept: URGENT CARE | Facility: CLINIC | Age: 54
End: 2019-06-01
Payer: COMMERCIAL

## 2019-06-01 ENCOUNTER — LAB VISIT (OUTPATIENT)
Dept: LAB | Facility: HOSPITAL | Age: 54
End: 2019-06-01
Attending: INTERNAL MEDICINE
Payer: COMMERCIAL

## 2019-06-01 VITALS
TEMPERATURE: 99 F | HEART RATE: 90 BPM | OXYGEN SATURATION: 98 % | HEIGHT: 70 IN | BODY MASS INDEX: 31.21 KG/M2 | WEIGHT: 218 LBS | DIASTOLIC BLOOD PRESSURE: 67 MMHG | RESPIRATION RATE: 16 BRPM | SYSTOLIC BLOOD PRESSURE: 116 MMHG

## 2019-06-01 DIAGNOSIS — T14.90XA INJURY: Primary | ICD-10-CM

## 2019-06-01 DIAGNOSIS — Z00.00 ROUTINE GENERAL MEDICAL EXAMINATION AT A HEALTH CARE FACILITY: ICD-10-CM

## 2019-06-01 LAB
ALBUMIN SERPL BCP-MCNC: 4.1 G/DL (ref 3.5–5.2)
ALP SERPL-CCNC: 47 U/L (ref 55–135)
ALT SERPL W/O P-5'-P-CCNC: 17 U/L (ref 10–44)
ANION GAP SERPL CALC-SCNC: 10 MMOL/L (ref 8–16)
AST SERPL-CCNC: 20 U/L (ref 10–40)
BASOPHILS # BLD AUTO: 0.01 K/UL (ref 0–0.2)
BASOPHILS NFR BLD: 0.2 % (ref 0–1.9)
BILIRUB SERPL-MCNC: 0.6 MG/DL (ref 0.1–1)
BUN SERPL-MCNC: 13 MG/DL (ref 6–20)
CALCIUM SERPL-MCNC: 9.8 MG/DL (ref 8.7–10.5)
CHLORIDE SERPL-SCNC: 105 MMOL/L (ref 95–110)
CHOLEST SERPL-MCNC: 176 MG/DL (ref 120–199)
CHOLEST/HDLC SERPL: 3.2 {RATIO} (ref 2–5)
CO2 SERPL-SCNC: 22 MMOL/L (ref 23–29)
COMPLEXED PSA SERPL-MCNC: 2.7 NG/ML (ref 0–4)
CREAT SERPL-MCNC: 1 MG/DL (ref 0.5–1.4)
DIFFERENTIAL METHOD: ABNORMAL
EOSINOPHIL # BLD AUTO: 0 K/UL (ref 0–0.5)
EOSINOPHIL NFR BLD: 0.6 % (ref 0–8)
ERYTHROCYTE [DISTWIDTH] IN BLOOD BY AUTOMATED COUNT: 13.2 % (ref 11.5–14.5)
EST. GFR  (AFRICAN AMERICAN): >60 ML/MIN/1.73 M^2
EST. GFR  (NON AFRICAN AMERICAN): >60 ML/MIN/1.73 M^2
GLUCOSE SERPL-MCNC: 90 MG/DL (ref 70–110)
HCT VFR BLD AUTO: 43.7 % (ref 40–54)
HDLC SERPL-MCNC: 55 MG/DL (ref 40–75)
HDLC SERPL: 31.3 % (ref 20–50)
HGB BLD-MCNC: 13.9 G/DL (ref 14–18)
IMM GRANULOCYTES # BLD AUTO: 0.01 K/UL (ref 0–0.04)
IMM GRANULOCYTES NFR BLD AUTO: 0.2 % (ref 0–0.5)
LDLC SERPL CALC-MCNC: 103 MG/DL (ref 63–159)
LYMPHOCYTES # BLD AUTO: 0.5 K/UL (ref 1–4.8)
LYMPHOCYTES NFR BLD: 8.5 % (ref 18–48)
MCH RBC QN AUTO: 29.4 PG (ref 27–31)
MCHC RBC AUTO-ENTMCNC: 31.8 G/DL (ref 32–36)
MCV RBC AUTO: 93 FL (ref 82–98)
MONOCYTES # BLD AUTO: 0.4 K/UL (ref 0.3–1)
MONOCYTES NFR BLD: 8 % (ref 4–15)
NEUTROPHILS # BLD AUTO: 4.5 K/UL (ref 1.8–7.7)
NEUTROPHILS NFR BLD: 82.5 % (ref 38–73)
NONHDLC SERPL-MCNC: 121 MG/DL
NRBC BLD-RTO: 0 /100 WBC
PLATELET # BLD AUTO: 196 K/UL (ref 150–350)
PMV BLD AUTO: 9.4 FL (ref 9.2–12.9)
POTASSIUM SERPL-SCNC: 4.1 MMOL/L (ref 3.5–5.1)
PROT SERPL-MCNC: 7.5 G/DL (ref 6–8.4)
RBC # BLD AUTO: 4.72 M/UL (ref 4.6–6.2)
SODIUM SERPL-SCNC: 137 MMOL/L (ref 136–145)
TRIGL SERPL-MCNC: 90 MG/DL (ref 30–150)
TSH SERPL DL<=0.005 MIU/L-ACNC: 1.69 UIU/ML (ref 0.4–4)
WBC # BLD AUTO: 5.4 K/UL (ref 3.9–12.7)

## 2019-06-01 PROCEDURE — 73140 X-RAY EXAM OF FINGER(S): CPT | Mod: FY,F5,S$GLB, | Performed by: RADIOLOGY

## 2019-06-01 PROCEDURE — 80053 COMPREHEN METABOLIC PANEL: CPT

## 2019-06-01 PROCEDURE — 36415 COLL VENOUS BLD VENIPUNCTURE: CPT | Mod: PO

## 2019-06-01 PROCEDURE — 73140 XR FINGER 2 OR MORE VIEWS RIGHT: ICD-10-PCS | Mod: FY,F5,S$GLB, | Performed by: RADIOLOGY

## 2019-06-01 PROCEDURE — 99214 OFFICE O/P EST MOD 30 MIN: CPT | Mod: S$GLB,,, | Performed by: NURSE PRACTITIONER

## 2019-06-01 PROCEDURE — 84443 ASSAY THYROID STIM HORMONE: CPT

## 2019-06-01 PROCEDURE — 84153 ASSAY OF PSA TOTAL: CPT

## 2019-06-01 PROCEDURE — 85025 COMPLETE CBC W/AUTO DIFF WBC: CPT

## 2019-06-01 PROCEDURE — 99214 PR OFFICE/OUTPT VISIT, EST, LEVL IV, 30-39 MIN: ICD-10-PCS | Mod: S$GLB,,, | Performed by: NURSE PRACTITIONER

## 2019-06-01 PROCEDURE — 3008F PR BODY MASS INDEX (BMI) DOCUMENTED: ICD-10-PCS | Mod: CPTII,S$GLB,, | Performed by: NURSE PRACTITIONER

## 2019-06-01 PROCEDURE — 80061 LIPID PANEL: CPT

## 2019-06-01 PROCEDURE — 3008F BODY MASS INDEX DOCD: CPT | Mod: CPTII,S$GLB,, | Performed by: NURSE PRACTITIONER

## 2019-06-01 RX ORDER — NAPROXEN 500 MG/1
500 TABLET ORAL 2 TIMES DAILY WITH MEALS
Qty: 20 TABLET | Refills: 0 | Status: SHIPPED | OUTPATIENT
Start: 2019-06-01 | End: 2019-06-11

## 2019-06-01 NOTE — PROGRESS NOTES
"Subjective:       Patient ID: Linda Mobley is a 54 y.o. male.    Vitals:  height is 5' 10" (1.778 m) and weight is 98.9 kg (218 lb). His oral temperature is 99.3 °F (37.4 °C). His blood pressure is 116/67 and his pulse is 90. His respiration is 16 and oxygen saturation is 98%.     Chief Complaint: Hand Pain    Hand Pain    The incident occurred more than 1 week ago. The incident occurred at home. There was no injury mechanism. The pain is present in the right hand. The quality of the pain is described as aching. The pain does not radiate. The pain is at a severity of 2/10. The pain is mild. The pain has been constant since the incident. Pertinent negatives include no chest pain, muscle weakness, numbness or tingling. The symptoms are aggravated by movement and lifting. He has tried nothing for the symptoms. The treatment provided no relief.       Constitution: Negative for chills, fatigue and fever.   HENT: Negative for congestion and sore throat.    Neck: Negative for painful lymph nodes.   Cardiovascular: Negative for chest pain and leg swelling.   Eyes: Negative for double vision and blurred vision.   Respiratory: Negative for cough and shortness of breath.    Gastrointestinal: Negative for nausea, vomiting and diarrhea.   Genitourinary: Negative for dysuria, frequency and urgency.   Musculoskeletal: Negative for joint pain, joint swelling, muscle cramps and muscle ache.   Skin: Negative for color change, pale and rash.   Allergic/Immunologic: Negative for seasonal allergies.   Neurological: Negative for dizziness, history of vertigo, light-headedness, passing out, headaches and numbness.   Hematologic/Lymphatic: Negative for swollen lymph nodes, easy bruising/bleeding and history of blood clots. Does not bruise/bleed easily.   Psychiatric/Behavioral: Negative for nervous/anxious, sleep disturbance and depression. The patient is not nervous/anxious.        Objective:      Physical Exam   Constitutional: He is " oriented to person, place, and time. Vital signs are normal. He appears well-developed and well-nourished. He is active and cooperative. No distress.   HENT:   Head: Normocephalic and atraumatic.   Nose: Nose normal.   Mouth/Throat: Oropharynx is clear and moist and mucous membranes are normal.   Eyes: Conjunctivae and lids are normal.   Neck: Trachea normal, normal range of motion, full passive range of motion without pain and phonation normal. Neck supple.   Cardiovascular: Normal rate, regular rhythm, normal heart sounds, intact distal pulses and normal pulses.   Pulmonary/Chest: Effort normal and breath sounds normal.   Abdominal: Soft. Normal appearance and bowel sounds are normal. He exhibits no abdominal bruit, no pulsatile midline mass and no mass.   Musculoskeletal: He exhibits no edema or deformity.   Neurological: He is alert and oriented to person, place, and time. He has normal strength and normal reflexes. No sensory deficit.   Skin: Skin is warm, dry and intact. He is not diaphoretic.   Psychiatric: He has a normal mood and affect. His speech is normal and behavior is normal. Judgment and thought content normal. Cognition and memory are normal.   Nursing note and vitals reviewed.      Assessment:       1. Injury        Plan:         Injury  -     X-Ray Finger 2 or More Views Right; Future; Expected date: 06/01/2019  -     naproxen (NAPROSYN) 500 MG tablet; Take 1 tablet (500 mg total) by mouth 2 (two) times daily with meals. As needed for pain. for 10 days  Dispense: 20 tablet; Refill: 0      Patient Instructions     Please follow up with your Primary care provider within 2-5 days if your signs and symptoms have not resolved or worsen.     If your condition worsens or fails to improve we recommend that you receive another evaluation at the emergency room immediately or contact your primary medical clinic to discuss your concerns.    You must understand that you have received an Urgent Care treatment  only and that you may be released before all of your medical problems are known or treated.   You, the patient, will arrange for follow up care as instructed.       ORTHO    R.I.C.E. to the affected joint or limb as needed:    Rest- the injured or sore extremity.  Ice- for the next 24-48 hours, alternating 20 minutes on and 20 minutes off as needed up to 3 times a day.   Compression-Use bandages to stabilize injured limb.  Check circulation to make sure  bandage is not too tight.    Elevate-when possible to reduce swelling.      Ice 20 minutes on and 20 minutes off as needed for pain.     Please drink plenty of fluids.    Please get plenty of rest.    Please return here or go to the Emergency Department for any concerns or worsening of condition.    Please be aware that narcotics can be addictive. If I gave you narcotics, I have given you a limited quantity to take as it is needed at this time. However take it sparingly and only when needed.  Do not operate machinery or drive on this medication.      If you were not prescribed an anti-inflammatory medication, and if you do not have any history of stomach/intestinal ulcers, or kidney disease, or are not taking a blood thinner such as Coumadin, Plavix, Pradaxa, Eloquis, or Xaralta for example, it is OK to take over the counter Ibuprofen or Advil or Motrin or Aleve as directed.  Do not take these medications on an empty stomach.    If you were given a splint wear it at all times unless otherwise instructed.     If you were given crutches use them as we instructed. Do not rest your armpits on the foam pad or you risk compressing the nerves and the vessels there.    Please follow up with your primary care doctor or specialist as needed.    If you lose control of your bowel and/or bladder, please go to the nearest Emergency Department immediately.  If you lose sensation in between your legs by your genitalia and/or rectum, please go to the nearest Emergency Department  immediately.  If you lose control or sensation of any extremity, please go to the nearest Emergency Department immediately.      R.I.C.E.    R.I.C.E. stands for Rest, Ice, Compression, and Elevation. Doing these things helps limit pain and swelling after an injury. R.I.C.E. also helps injuries heal faster. Use R.I.C.E. for sprains, strains, and severe bruises or bumps. Follow the tips on this handout and begin R.I.C.E. as soon as possible after an injury.  ? Rest  Pain is your bodys way of telling you to rest an injured area. Whether you have hurt an elbow, hand, foot, or knee, limiting its use will prevent further injury and help you heal.  ? Ice  Applying ice right after an injury helps prevent swelling and reduce pain. Dont place ice directly on your skin.  · Wrap a cold pack or bag of ice in a thin cloth. Place it over the injured area.  · Ice for 10 minutes every 3 hours. Dont ice for more than 20 minutes at a time.  ? Compression  Putting pressure (compression) on an injury helps prevent swelling and provides support.  · Wrap the injured area firmly with an elastic bandage. If your hand or foot tingles, becomes discolored, or feels cold to the touch, the bandage may be too tight. Rewrap it more loosely.  · If your bandage becomes too loose, rewrap it.  · Do not wear an elastic bandage overnight.  ? Elevation  Keeping an injury elevated helps reduce swelling, pain, and throbbing. Elevation is most effective when the injury is kept elevated higher than the heart.     Call your healthcare provider if you notice any of the following:  · Fingers or toes feel numb, are cold to the touch, or change color  · Skin looks shiny or tight  · Pain, swelling, or bruising worsens and is not improved with elevation   Date Last Reviewed: 9/3/2015  © 8030-7904 [employer]. [address] [place] [address], [place] [address]. All rights reserved. This information is not intended as a substitute for professional medical  care. Always follow your healthcare professional's instructions.

## 2019-06-01 NOTE — PATIENT INSTRUCTIONS
Please follow up with your Primary care provider within 2-5 days if your signs and symptoms have not resolved or worsen.     If your condition worsens or fails to improve we recommend that you receive another evaluation at the emergency room immediately or contact your primary medical clinic to discuss your concerns.    You must understand that you have received an Urgent Care treatment only and that you may be released before all of your medical problems are known or treated.   You, the patient, will arrange for follow up care as instructed.       ORTHO    R.I.C.E. to the affected joint or limb as needed:    Rest- the injured or sore extremity.  Ice- for the next 24-48 hours, alternating 20 minutes on and 20 minutes off as needed up to 3 times a day.   Compression-Use bandages to stabilize injured limb.  Check circulation to make sure  bandage is not too tight.    Elevate-when possible to reduce swelling.      Ice 20 minutes on and 20 minutes off as needed for pain.     Please drink plenty of fluids.    Please get plenty of rest.    Please return here or go to the Emergency Department for any concerns or worsening of condition.    Please be aware that narcotics can be addictive. If I gave you narcotics, I have given you a limited quantity to take as it is needed at this time. However take it sparingly and only when needed.  Do not operate machinery or drive on this medication.      If you were not prescribed an anti-inflammatory medication, and if you do not have any history of stomach/intestinal ulcers, or kidney disease, or are not taking a blood thinner such as Coumadin, Plavix, Pradaxa, Eloquis, or Xaralta for example, it is OK to take over the counter Ibuprofen or Advil or Motrin or Aleve as directed.  Do not take these medications on an empty stomach.    If you were given a splint wear it at all times unless otherwise instructed.     If you were given crutches use them as we instructed. Do not rest your  armpits on the foam pad or you risk compressing the nerves and the vessels there.    Please follow up with your primary care doctor or specialist as needed.    If you lose control of your bowel and/or bladder, please go to the nearest Emergency Department immediately.  If you lose sensation in between your legs by your genitalia and/or rectum, please go to the nearest Emergency Department immediately.  If you lose control or sensation of any extremity, please go to the nearest Emergency Department immediately.      R.I.C.E.    R.I.C.E. stands for Rest, Ice, Compression, and Elevation. Doing these things helps limit pain and swelling after an injury. R.I.C.E. also helps injuries heal faster. Use R.I.C.E. for sprains, strains, and severe bruises or bumps. Follow the tips on this handout and begin R.I.C.E. as soon as possible after an injury.  ? Rest  Pain is your bodys way of telling you to rest an injured area. Whether you have hurt an elbow, hand, foot, or knee, limiting its use will prevent further injury and help you heal.  ? Ice  Applying ice right after an injury helps prevent swelling and reduce pain. Dont place ice directly on your skin.  · Wrap a cold pack or bag of ice in a thin cloth. Place it over the injured area.  · Ice for 10 minutes every 3 hours. Dont ice for more than 20 minutes at a time.  ? Compression  Putting pressure (compression) on an injury helps prevent swelling and provides support.  · Wrap the injured area firmly with an elastic bandage. If your hand or foot tingles, becomes discolored, or feels cold to the touch, the bandage may be too tight. Rewrap it more loosely.  · If your bandage becomes too loose, rewrap it.  · Do not wear an elastic bandage overnight.  ? Elevation  Keeping an injury elevated helps reduce swelling, pain, and throbbing. Elevation is most effective when the injury is kept elevated higher than the heart.     Call your healthcare provider if you notice any of the  following:  · Fingers or toes feel numb, are cold to the touch, or change color  · Skin looks shiny or tight  · Pain, swelling, or bruising worsens and is not improved with elevation   Date Last Reviewed: 9/3/2015  © 5427-2462 [employer]. [address] [place] [address]. All rights reserved. This information is not intended as a substitute for professional medical care. Always follow your healthcare professional's instructions.

## 2019-06-04 ENCOUNTER — OFFICE VISIT (OUTPATIENT)
Dept: INTERNAL MEDICINE | Facility: CLINIC | Age: 54
End: 2019-06-04
Payer: COMMERCIAL

## 2019-06-04 ENCOUNTER — TELEPHONE (OUTPATIENT)
Dept: URGENT CARE | Facility: CLINIC | Age: 54
End: 2019-06-04

## 2019-06-04 VITALS
RESPIRATION RATE: 18 BRPM | HEIGHT: 70 IN | BODY MASS INDEX: 31.22 KG/M2 | HEART RATE: 68 BPM | TEMPERATURE: 98 F | SYSTOLIC BLOOD PRESSURE: 110 MMHG | DIASTOLIC BLOOD PRESSURE: 70 MMHG | WEIGHT: 218.06 LBS

## 2019-06-04 DIAGNOSIS — Z00.00 WELL ADULT EXAM: Primary | ICD-10-CM

## 2019-06-04 DIAGNOSIS — K63.5 POLYP OF COLON, UNSPECIFIED PART OF COLON, UNSPECIFIED TYPE: ICD-10-CM

## 2019-06-04 DIAGNOSIS — S69.91XS INJURY OF RIGHT THUMB, SEQUELA: ICD-10-CM

## 2019-06-04 DIAGNOSIS — E78.5 HYPERLIPIDEMIA, UNSPECIFIED HYPERLIPIDEMIA TYPE: ICD-10-CM

## 2019-06-04 DIAGNOSIS — J31.0 CHRONIC RHINITIS: ICD-10-CM

## 2019-06-04 PROCEDURE — 99999 PR PBB SHADOW E&M-EST. PATIENT-LVL III: ICD-10-PCS | Mod: PBBFAC,,, | Performed by: INTERNAL MEDICINE

## 2019-06-04 PROCEDURE — 99396 PR PREVENTIVE VISIT,EST,40-64: ICD-10-PCS | Mod: S$GLB,,, | Performed by: INTERNAL MEDICINE

## 2019-06-04 PROCEDURE — 99999 PR PBB SHADOW E&M-EST. PATIENT-LVL III: CPT | Mod: PBBFAC,,, | Performed by: INTERNAL MEDICINE

## 2019-06-04 PROCEDURE — 99396 PREV VISIT EST AGE 40-64: CPT | Mod: S$GLB,,, | Performed by: INTERNAL MEDICINE

## 2019-06-04 NOTE — PROGRESS NOTES
Subjective:       Patient ID: Linda Mobley is a 54 y.o. male.    Chief Complaint: Annual Exam    HPI   The patient presents for annual physical examination.  Active medical conditions include hyperlipidemia, chronic rhinitis, chronic lower back pain, colon polyps.  The patient remains physically active.  His work involves quite a bit of walking.  He does not have a regimented exercise routine.    His diet has been variable.  His last colonoscopy was on 08/31/2018.  Follow-up screening examination in 5 years was recommended.    He is not experiencing any current symptoms of rhinitis.    He injured his right thumb cutting grass 1 month ago.  He was seen at an urgent care center; a fracture was ruled out at that time.  He has been using Aleve intermittently.  He is still noting thumb pain. Chronic lower back pain symptoms are stable.  No recent exacerbations have been noted.  Review of Systems   Constitutional: Negative for activity change, appetite change, chills, fatigue, fever and unexpected weight change.   HENT: Negative for congestion, ear pain, hearing loss, nosebleeds, postnasal drip, rhinorrhea and trouble swallowing.    Eyes: Negative for pain, discharge, redness, itching and visual disturbance.   Respiratory: Negative for cough, chest tightness, shortness of breath and wheezing.    Cardiovascular: Negative for chest pain, palpitations and leg swelling.   Gastrointestinal: Negative for abdominal pain, blood in stool, constipation, diarrhea, nausea and vomiting.   Endocrine: Negative for polydipsia and polyuria.   Genitourinary: Negative for difficulty urinating, dysuria, frequency, hematuria and urgency.   Musculoskeletal: Positive for arthralgias and back pain. Negative for gait problem, joint swelling, myalgias, neck pain and neck stiffness.   Skin: Negative for color change and rash.   Neurological: Negative for dizziness, seizures, syncope, weakness, light-headedness, numbness and headaches.    Hematological: Does not bruise/bleed easily.   Psychiatric/Behavioral: Negative for agitation, confusion, dysphoric mood, hallucinations and sleep disturbance. The patient is not nervous/anxious.        Objective:      Physical Exam   Constitutional: He is oriented to person, place, and time. He appears well-developed and well-nourished. No distress.   HENT:   Head: Normocephalic and atraumatic.   Right Ear: External ear normal.   Left Ear: External ear normal.   Mouth/Throat: Oropharynx is clear and moist. No oropharyngeal exudate.   Eyes: Pupils are equal, round, and reactive to light. Conjunctivae and EOM are normal. No scleral icterus.   Neck: Normal range of motion. Neck supple. No JVD present. No thyromegaly present.   Cardiovascular: Normal rate, regular rhythm, normal heart sounds and intact distal pulses. Exam reveals no gallop and no friction rub.   No murmur heard.  Pulmonary/Chest: Effort normal and breath sounds normal. No respiratory distress. He has no wheezes. He has no rales.   Abdominal: Soft. Bowel sounds are normal. He exhibits no mass. There is no tenderness.   Genitourinary: Penis normal.   Genitourinary Comments:   No inguinal hernia.  No testicular masses.   Musculoskeletal: Normal range of motion. He exhibits no edema or tenderness.   Lymphadenopathy:     He has no cervical adenopathy.   Neurological: He is alert and oriented to person, place, and time. No cranial nerve deficit. He exhibits normal muscle tone.   Skin: Skin is warm and dry. No rash noted.   Psychiatric: He has a normal mood and affect. His behavior is normal.       Lab Visit on 06/01/2019   Component Date Value Ref Range Status    Specimen  06/01/2019 Urine, Clean Catch   Final    Color, UA 06/01/2019 Yellow  Yellow, Straw, Jennifer Final    Appearance, UA 06/01/2019 Clear  Clear Final    pH, UA 06/01/2019 5.0  5.0 - 8.0 Final    Specific [place], UA 06/01/2019 1.030  1.005 - 1.030 Final    Protein, UA 06/01/2019  Negative  Negative Final    Comment: Recommend a 24 hour urine protein or a urine   protein/creatinine ratio if globulin induced proteinuria is  clinically suspected.      Glucose, UA 06/01/2019 Negative  Negative Final    Ketones, UA 06/01/2019 Negative  Negative Final    Bilirubin (UA) 06/01/2019 Negative  Negative Final    Occult Blood UA 06/01/2019 Negative  Negative Final    Nitrite, UA 06/01/2019 Negative  Negative Final    Leukocytes, UA 06/01/2019 Negative  Negative Final   Lab Visit on 06/01/2019   Component Date Value Ref Range Status    WBC 06/01/2019 5.40  3.90 - 12.70 K/uL Final    RBC 06/01/2019 4.72  4.60 - 6.20 M/uL Final    Hemoglobin 06/01/2019 13.9* 14.0 - 18.0 g/dL Final    Hematocrit 06/01/2019 43.7  40.0 - 54.0 % Final    Mean Corpuscular Volume 06/01/2019 93  82 - 98 fL Final    Mean Corpuscular Hemoglobin 06/01/2019 29.4  27.0 - 31.0 pg Final    Mean Corpuscular Hemoglobin Conc 06/01/2019 31.8* 32.0 - 36.0 g/dL Final    RDW 06/01/2019 13.2  11.5 - 14.5 % Final    Platelets 06/01/2019 196  150 - 350 K/uL Final    MPV 06/01/2019 9.4  9.2 - 12.9 fL Final    Immature Granulocytes 06/01/2019 0.2  0.0 - 0.5 % Final    Gran # (ANC) 06/01/2019 4.5  1.8 - 7.7 K/uL Final    Immature Grans (Abs) 06/01/2019 0.01  0.00 - 0.04 K/uL Final    Comment: Mild elevation in immature granulocytes is non specific and   can be seen in a variety of conditions including stress response,   acute inflammation, trauma and pregnancy. Correlation with other   laboratory and clinical findings is essential.      Lymph # 06/01/2019 0.5* 1.0 - 4.8 K/uL Final    Mono # 06/01/2019 0.4  0.3 - 1.0 K/uL Final    Eos # 06/01/2019 0.0  0.0 - 0.5 K/uL Final    Baso # 06/01/2019 0.01  0.00 - 0.20 K/uL Final    nRBC 06/01/2019 0  0 /100 WBC Final    Gran% 06/01/2019 82.5* 38.0 - 73.0 % Final    Lymph% 06/01/2019 8.5* 18.0 - 48.0 % Final    Mono% 06/01/2019 8.0  4.0 - 15.0 % Final    Eosinophil% 06/01/2019 0.6   0.0 - 8.0 % Final    Basophil% 06/01/2019 0.2  0.0 - 1.9 % Final    Differential Method 06/01/2019 Automated   Final    Sodium 06/01/2019 137  136 - 145 mmol/L Final    Potassium 06/01/2019 4.1  3.5 - 5.1 mmol/L Final    Chloride 06/01/2019 105  95 - 110 mmol/L Final    CO2 06/01/2019 22* 23 - 29 mmol/L Final    Glucose 06/01/2019 90  70 - 110 mg/dL Final    BUN, Bld 06/01/2019 13  6 - 20 mg/dL Final    Creatinine 06/01/2019 1.0  0.5 - 1.4 mg/dL Final    Calcium 06/01/2019 9.8  8.7 - 10.5 mg/dL Final    Total Protein 06/01/2019 7.5  6.0 - 8.4 g/dL Final    Albumin 06/01/2019 4.1  3.5 - 5.2 g/dL Final    Total Bilirubin 06/01/2019 0.6  0.1 - 1.0 mg/dL Final    Comment: For infants and newborns, interpretation of results should be based  on gestational age, weight and in agreement with clinical  observations.  Premature Infant recommended reference ranges:  Up to 24 hours.............<8.0 mg/dL  Up to 48 hours............<12.0 mg/dL  3-5 days..................<15.0 mg/dL  6-29 days.................<15.0 mg/dL      Alkaline Phosphatase 06/01/2019 47* 55 - 135 U/L Final    AST 06/01/2019 20  10 - 40 U/L Final    ALT 06/01/2019 17  10 - 44 U/L Final    Anion Gap 06/01/2019 10  8 - 16 mmol/L Final    eGFR if African American 06/01/2019 >60.0  >60 mL/min/1.73 m^2 Final    eGFR if non African American 06/01/2019 >60.0  >60 mL/min/1.73 m^2 Final    Comment: Calculation used to obtain the estimated glomerular filtration  rate (eGFR) is the CKD-EPI equation.       Cholesterol 06/01/2019 176  120 - 199 mg/dL Final    Comment: The National Cholesterol Education Program (NCEP) has set the  following guidelines (reference ranges) for Cholesterol:  Optimal.....................<200 mg/dL  Borderline High.............200-239 mg/dL  High........................> or = 240 mg/dL      Triglycerides 06/01/2019 90  30 - 150 mg/dL Final    Comment: The National Cholesterol Education Program (NCEP) has set  the  following guidelines (reference values) for triglycerides:  Normal......................<150 mg/dL  Borderline High.............150-199 mg/dL  High........................200-499 mg/dL      HDL 06/01/2019 55  40 - 75 mg/dL Final    Comment: The National Cholesterol Education Program (NCEP) has set the  following guidelines (reference values) for HDL Cholesterol:  Low...............<40 mg/dL  Optimal...........>60 mg/dL      LDL Cholesterol 06/01/2019 103.0  63.0 - 159.0 mg/dL Final    Comment: The National Cholesterol Education Program (NCEP) has set the  following guidelines (reference values) for LDL Cholesterol:  Optimal.......................<130 mg/dL  Borderline High...............130-159 mg/dL  High..........................160-189 mg/dL  Very High.....................>190 mg/dL      Hdl/Cholesterol Ratio 06/01/2019 31.3  20.0 - 50.0 % Final    Total Cholesterol/HDL Ratio 06/01/2019 3.2  2.0 - 5.0 Final    Non-HDL Cholesterol 06/01/2019 121  mg/dL Final    Comment: Risk category and Non-HDL cholesterol goals:  Coronary heart disease (CHD)or equivalent (10-year risk of CHD >20%):  Non-HDL cholesterol goal     <130 mg/dL  Two or more CHD risk factors and 10-year risk of CHD <= 20%:  Non-HDL cholesterol goal     <160 mg/dL  0 to 1 CHD risk factor:  Non-HDL cholesterol goal     <190 mg/dL      TSH 06/01/2019 1.690  0.400 - 4.000 uIU/mL Final    PSA, SCREEN 06/01/2019 2.7  0.00 - 4.00 ng/mL Final    Comment: PSA Expected levels:  Hormonal Therapy: <0.05 ng/ml  Prostatectomy: <0.01 ng/ml  Radiation Therapy: <1.00 ng/ml         Assessment:       1. Well adult exam    2. Hyperlipidemia, unspecified hyperlipidemia type    3. Chronic rhinitis    4. Injury of right thumb, sequela    5. Polyp of colon, unspecified part of colon, unspecified type        Plan:       Linda was seen today for annual exam.  Consider orthopedic hand surgery consultation right thumb symptoms persist.  The patient will notify us.   The patient was advised to use Aleve 2 tablets twice daily for the next 3-4 weeks treatment of thumb joint discomfort.  The patient has been encouraged to lose weight.  Annual follow-up evaluation is recommended.  Patient may return to clinic as needed.    Diagnoses and all orders for this visit:    Well adult exam    Hyperlipidemia, unspecified hyperlipidemia type    Chronic rhinitis    Injury of right thumb, sequela    Polyp of colon, unspecified part of colon, unspecified type

## 2020-05-21 ENCOUNTER — TELEPHONE (OUTPATIENT)
Dept: INTERNAL MEDICINE | Facility: CLINIC | Age: 55
End: 2020-05-21

## 2020-05-21 DIAGNOSIS — Z00.00 WELL ADULT EXAM: Primary | ICD-10-CM

## 2020-05-21 NOTE — TELEPHONE ENCOUNTER
----- Message from Len Fleming sent at 5/21/2020  7:12 AM CDT -----  Contact: SELF [phone]  Patient has an upcoming lab appointment at the [facility] on 05/25    Please enter lab orders and link them to this appointment.    Thanks,  [facility] 5th floor reception desk

## 2020-05-25 ENCOUNTER — LAB VISIT (OUTPATIENT)
Dept: LAB | Facility: HOSPITAL | Age: 55
End: 2020-05-25
Attending: INTERNAL MEDICINE
Payer: COMMERCIAL

## 2020-05-25 DIAGNOSIS — Z00.00 WELL ADULT EXAM: ICD-10-CM

## 2020-05-25 LAB
ALBUMIN SERPL BCP-MCNC: 4.1 G/DL (ref 3.5–5.2)
ALP SERPL-CCNC: 46 U/L (ref 55–135)
ALT SERPL W/O P-5'-P-CCNC: 14 U/L (ref 10–44)
ANION GAP SERPL CALC-SCNC: 10 MMOL/L (ref 8–16)
AST SERPL-CCNC: 15 U/L (ref 10–40)
BASOPHILS # BLD AUTO: 0.02 K/UL (ref 0–0.2)
BASOPHILS NFR BLD: 0.5 % (ref 0–1.9)
BILIRUB SERPL-MCNC: 0.4 MG/DL (ref 0.1–1)
BUN SERPL-MCNC: 11 MG/DL (ref 6–20)
CALCIUM SERPL-MCNC: 9.6 MG/DL (ref 8.7–10.5)
CHLORIDE SERPL-SCNC: 105 MMOL/L (ref 95–110)
CHOLEST SERPL-MCNC: 187 MG/DL (ref 120–199)
CHOLEST/HDLC SERPL: 3.4 {RATIO} (ref 2–5)
CO2 SERPL-SCNC: 24 MMOL/L (ref 23–29)
COMPLEXED PSA SERPL-MCNC: 2.1 NG/ML (ref 0–4)
CREAT SERPL-MCNC: 1 MG/DL (ref 0.5–1.4)
DIFFERENTIAL METHOD: ABNORMAL
EOSINOPHIL # BLD AUTO: 0.1 K/UL (ref 0–0.5)
EOSINOPHIL NFR BLD: 2.3 % (ref 0–8)
ERYTHROCYTE [DISTWIDTH] IN BLOOD BY AUTOMATED COUNT: 12.7 % (ref 11.5–14.5)
EST. GFR  (AFRICAN AMERICAN): >60 ML/MIN/1.73 M^2
EST. GFR  (NON AFRICAN AMERICAN): >60 ML/MIN/1.73 M^2
GLUCOSE SERPL-MCNC: 94 MG/DL (ref 70–110)
HCT VFR BLD AUTO: 42.9 % (ref 40–54)
HDLC SERPL-MCNC: 55 MG/DL (ref 40–75)
HDLC SERPL: 29.4 % (ref 20–50)
HGB BLD-MCNC: 13.8 G/DL (ref 14–18)
IMM GRANULOCYTES # BLD AUTO: 0.01 K/UL (ref 0–0.04)
IMM GRANULOCYTES NFR BLD AUTO: 0.2 % (ref 0–0.5)
LDLC SERPL CALC-MCNC: 118.2 MG/DL (ref 63–159)
LYMPHOCYTES # BLD AUTO: 1.7 K/UL (ref 1–4.8)
LYMPHOCYTES NFR BLD: 39.5 % (ref 18–48)
MCH RBC QN AUTO: 29.1 PG (ref 27–31)
MCHC RBC AUTO-ENTMCNC: 32.2 G/DL (ref 32–36)
MCV RBC AUTO: 91 FL (ref 82–98)
MONOCYTES # BLD AUTO: 0.4 K/UL (ref 0.3–1)
MONOCYTES NFR BLD: 8.2 % (ref 4–15)
NEUTROPHILS # BLD AUTO: 2.2 K/UL (ref 1.8–7.7)
NEUTROPHILS NFR BLD: 49.3 % (ref 38–73)
NONHDLC SERPL-MCNC: 132 MG/DL
NRBC BLD-RTO: 0 /100 WBC
PLATELET # BLD AUTO: 207 K/UL (ref 150–350)
PMV BLD AUTO: 9.2 FL (ref 9.2–12.9)
POTASSIUM SERPL-SCNC: 4 MMOL/L (ref 3.5–5.1)
PROT SERPL-MCNC: 7.4 G/DL (ref 6–8.4)
RBC # BLD AUTO: 4.74 M/UL (ref 4.6–6.2)
SODIUM SERPL-SCNC: 139 MMOL/L (ref 136–145)
TRIGL SERPL-MCNC: 69 MG/DL (ref 30–150)
TSH SERPL DL<=0.005 MIU/L-ACNC: 1.6 UIU/ML (ref 0.4–4)
WBC # BLD AUTO: 4.38 K/UL (ref 3.9–12.7)

## 2020-05-25 PROCEDURE — 36415 COLL VENOUS BLD VENIPUNCTURE: CPT | Mod: PO

## 2020-05-25 PROCEDURE — 85025 COMPLETE CBC W/AUTO DIFF WBC: CPT

## 2020-05-25 PROCEDURE — 84153 ASSAY OF PSA TOTAL: CPT

## 2020-05-25 PROCEDURE — 80053 COMPREHEN METABOLIC PANEL: CPT

## 2020-05-25 PROCEDURE — 80061 LIPID PANEL: CPT

## 2020-05-25 PROCEDURE — 84443 ASSAY THYROID STIM HORMONE: CPT

## 2020-06-05 ENCOUNTER — OFFICE VISIT (OUTPATIENT)
Dept: INTERNAL MEDICINE | Facility: CLINIC | Age: 55
End: 2020-06-05
Payer: COMMERCIAL

## 2020-06-05 VITALS
HEIGHT: 70 IN | BODY MASS INDEX: 31.78 KG/M2 | WEIGHT: 222 LBS | DIASTOLIC BLOOD PRESSURE: 82 MMHG | SYSTOLIC BLOOD PRESSURE: 128 MMHG | HEART RATE: 83 BPM | TEMPERATURE: 98 F | RESPIRATION RATE: 18 BRPM

## 2020-06-05 DIAGNOSIS — E78.5 HYPERLIPIDEMIA, UNSPECIFIED HYPERLIPIDEMIA TYPE: ICD-10-CM

## 2020-06-05 DIAGNOSIS — Z00.00 WELL ADULT EXAM: Primary | ICD-10-CM

## 2020-06-05 PROCEDURE — 99999 PR PBB SHADOW E&M-EST. PATIENT-LVL III: CPT | Mod: PBBFAC,,, | Performed by: INTERNAL MEDICINE

## 2020-06-05 PROCEDURE — 99396 PREV VISIT EST AGE 40-64: CPT | Mod: S$GLB,,, | Performed by: INTERNAL MEDICINE

## 2020-06-05 PROCEDURE — 99999 PR PBB SHADOW E&M-EST. PATIENT-LVL III: ICD-10-PCS | Mod: PBBFAC,,, | Performed by: INTERNAL MEDICINE

## 2020-06-05 PROCEDURE — 99396 PR PREVENTIVE VISIT,EST,40-64: ICD-10-PCS | Mod: S$GLB,,, | Performed by: INTERNAL MEDICINE

## 2020-06-05 NOTE — PROGRESS NOTES
Answers for HPI/ROS submitted by the patient on 6/4/2020   activity change: No  unexpected weight change: No  neck pain: No  hearing loss: No  rhinorrhea: No  trouble swallowing: No  eye discharge: No  visual disturbance: No  chest tightness: No  wheezing: No  chest pain: No  palpitations: No  blood in stool: No  constipation: No  vomiting: No  diarrhea: No  polydipsia: No  polyuria: No  difficulty urinating: No  urgency: No  hematuria: No  joint swelling: No  arthralgias: No  headaches: No  weakness: No  confusion: No  dysphoric mood: No

## 2020-06-29 NOTE — PROGRESS NOTES
Subjective:       Patient ID: Linda Mobley is a 55 y.o. male.    Chief Complaint: Annual Exam    HPI   The patient presents for annual physical examination.  He is doing well.  His rhinitis symptoms have been controlled.  He does not exercise routinely but he does walk and perform yd work.    Active medical conditions include chronic rhinitis, hyperlipidemia, obesity, and vitamin-D deficiency.    Immunization records were reviewed.    Screening tests were reviewed.    No interval change in past medical history, family history, or social history since prior evaluations.    Review of Systems   Constitutional: Negative for activity change, appetite change, chills, fatigue, fever and unexpected weight change.   HENT: Negative for nasal congestion, ear pain, hearing loss, nosebleeds, postnasal drip, rhinorrhea and trouble swallowing.    Eyes: Negative for pain, discharge, redness, itching and visual disturbance.   Respiratory: Negative for cough, chest tightness, shortness of breath and wheezing.    Cardiovascular: Negative for chest pain, palpitations and leg swelling.   Gastrointestinal: Negative for abdominal pain, blood in stool, constipation, diarrhea, nausea and vomiting.   Endocrine: Negative for polydipsia and polyuria.   Genitourinary: Negative for difficulty urinating, dysuria, frequency, hematuria and urgency.   Musculoskeletal: Negative for arthralgias, back pain, gait problem, joint swelling, myalgias, neck pain and neck stiffness.   Integumentary:  Negative for color change and rash.   Neurological: Negative for dizziness, seizures, syncope, weakness, light-headedness, numbness and headaches.   Hematological: Does not bruise/bleed easily.   Psychiatric/Behavioral: Negative for agitation, confusion, dysphoric mood, hallucinations and sleep disturbance. The patient is not nervous/anxious.          Objective:      Physical Exam  Vitals signs and nursing note reviewed.   Constitutional:       General: He is  not in acute distress.     Appearance: He is well-developed.      Comments: The patient has gained 4 lb since 06/04/2019.   HENT:      Head: Normocephalic and atraumatic.      Right Ear: External ear normal.      Left Ear: External ear normal.      Mouth/Throat:      Pharynx: No oropharyngeal exudate.   Eyes:      General: No scleral icterus.     Conjunctiva/sclera: Conjunctivae normal.      Pupils: Pupils are equal, round, and reactive to light.   Neck:      Musculoskeletal: Normal range of motion and neck supple.      Thyroid: No thyromegaly.      Vascular: No JVD.   Cardiovascular:      Rate and Rhythm: Normal rate and regular rhythm.      Heart sounds: Normal heart sounds. No murmur. No friction rub. No gallop.    Pulmonary:      Effort: Pulmonary effort is normal. No respiratory distress.      Breath sounds: Normal breath sounds. No wheezing or rales.   Abdominal:      General: Bowel sounds are normal.      Palpations: Abdomen is soft. There is no mass.      Tenderness: There is no abdominal tenderness.   Genitourinary:     Penis: Normal.       Comments:   No inguinal hernia.  No testicular masses.  Musculoskeletal: Normal range of motion.         General: No tenderness.   Lymphadenopathy:      Cervical: No cervical adenopathy.   Skin:     General: Skin is warm and dry.      Findings: No rash.   Neurological:      Mental Status: He is alert and oriented to person, place, and time.      Cranial Nerves: No cranial nerve deficit.      Motor: No abnormal muscle tone.   Psychiatric:         Behavior: Behavior normal.         Lab Visit on 05/25/2020   Component Date Value Ref Range Status    Specimen UA 05/25/2020 Urine, Unspecified   Final    Color, UA 05/25/2020 Yellow  Yellow, Straw, Jennifer Final    Appearance, UA 05/25/2020 Hazy* Clear Final    pH, UA 05/25/2020 5.0  5.0 - 8.0 Final    Specific Gravity, UA 05/25/2020 1.025  1.005 - 1.030 Final    Protein, UA 05/25/2020 Negative  Negative Final    Comment:  Recommend a 24 hour urine protein or a urine   protein/creatinine ratio if globulin induced proteinuria is  clinically suspected.      Glucose, UA 05/25/2020 Negative  Negative Final    Ketones, UA 05/25/2020 Negative  Negative Final    Bilirubin (UA) 05/25/2020 Negative  Negative Final    Occult Blood UA 05/25/2020 Negative  Negative Final    Nitrite, UA 05/25/2020 Negative  Negative Final    Leukocytes, UA 05/25/2020 Negative  Negative Final   Lab Visit on 05/25/2020   Component Date Value Ref Range Status    Sodium 05/25/2020 139  136 - 145 mmol/L Final    Potassium 05/25/2020 4.0  3.5 - 5.1 mmol/L Final    Chloride 05/25/2020 105  95 - 110 mmol/L Final    CO2 05/25/2020 24  23 - 29 mmol/L Final    Glucose 05/25/2020 94  70 - 110 mg/dL Final    BUN, Bld 05/25/2020 11  6 - 20 mg/dL Final    Creatinine 05/25/2020 1.0  0.5 - 1.4 mg/dL Final    Calcium 05/25/2020 9.6  8.7 - 10.5 mg/dL Final    Total Protein 05/25/2020 7.4  6.0 - 8.4 g/dL Final    Albumin 05/25/2020 4.1  3.5 - 5.2 g/dL Final    Total Bilirubin 05/25/2020 0.4  0.1 - 1.0 mg/dL Final    Comment: For infants and newborns, interpretation of results should be based  on gestational age, weight and in agreement with clinical  observations.  Premature Infant recommended reference ranges:  Up to 24 hours.............<8.0 mg/dL  Up to 48 hours............<12.0 mg/dL  3-5 days..................<15.0 mg/dL  6-29 days.................<15.0 mg/dL      Alkaline Phosphatase 05/25/2020 46* 55 - 135 U/L Final    AST 05/25/2020 15  10 - 40 U/L Final    ALT 05/25/2020 14  10 - 44 U/L Final    Anion Gap 05/25/2020 10  8 - 16 mmol/L Final    eGFR if African American 05/25/2020 >60.0  >60 mL/min/1.73 m^2 Final    eGFR if non African American 05/25/2020 >60.0  >60 mL/min/1.73 m^2 Final    Comment: Calculation used to obtain the estimated glomerular filtration  rate (eGFR) is the CKD-EPI equation.       Cholesterol 05/25/2020 187  120 - 199 mg/dL Final     Comment: The National Cholesterol Education Program (NCEP) has set the  following guidelines (reference ranges) for Cholesterol:  Optimal.....................<200 mg/dL  Borderline High.............200-239 mg/dL  High........................> or = 240 mg/dL      Triglycerides 05/25/2020 69  30 - 150 mg/dL Final    Comment: The National Cholesterol Education Program (NCEP) has set the  following guidelines (reference values) for triglycerides:  Normal......................<150 mg/dL  Borderline High.............150-199 mg/dL  High........................200-499 mg/dL      HDL 05/25/2020 55  40 - 75 mg/dL Final    Comment: The National Cholesterol Education Program (NCEP) has set the  following guidelines (reference values) for HDL Cholesterol:  Low...............<40 mg/dL  Optimal...........>60 mg/dL      LDL Cholesterol 05/25/2020 118.2  63.0 - 159.0 mg/dL Final    Comment: The National Cholesterol Education Program (NCEP) has set the  following guidelines (reference values) for LDL Cholesterol:  Optimal.......................<130 mg/dL  Borderline High...............130-159 mg/dL  High..........................160-189 mg/dL  Very High.....................>190 mg/dL      Hdl/Cholesterol Ratio 05/25/2020 29.4  20.0 - 50.0 % Final    Total Cholesterol/HDL Ratio 05/25/2020 3.4  2.0 - 5.0 Final    Non-HDL Cholesterol 05/25/2020 132  mg/dL Final    Comment: Risk category and Non-HDL cholesterol goals:  Coronary heart disease (CHD)or equivalent (10-year risk of CHD >20%):  Non-HDL cholesterol goal     <130 mg/dL  Two or more CHD risk factors and 10-year risk of CHD <= 20%:  Non-HDL cholesterol goal     <160 mg/dL  0 to 1 CHD risk factor:  Non-HDL cholesterol goal     <190 mg/dL      WBC 05/25/2020 4.38  3.90 - 12.70 K/uL Final    RBC 05/25/2020 4.74  4.60 - 6.20 M/uL Final    Hemoglobin 05/25/2020 13.8* 14.0 - 18.0 g/dL Final    Hematocrit 05/25/2020 42.9  40.0 - 54.0 % Final    Mean Corpuscular Volume  05/25/2020 91  82 - 98 fL Final    Mean Corpuscular Hemoglobin 05/25/2020 29.1  27.0 - 31.0 pg Final    Mean Corpuscular Hemoglobin Conc 05/25/2020 32.2  32.0 - 36.0 g/dL Final    RDW 05/25/2020 12.7  11.5 - 14.5 % Final    Platelets 05/25/2020 207  150 - 350 K/uL Final    MPV 05/25/2020 9.2  9.2 - 12.9 fL Final    Immature Granulocytes 05/25/2020 0.2  0.0 - 0.5 % Final    Gran # (ANC) 05/25/2020 2.2  1.8 - 7.7 K/uL Final    Immature Grans (Abs) 05/25/2020 0.01  0.00 - 0.04 K/uL Final    Comment: Mild elevation in immature granulocytes is non specific and   can be seen in a variety of conditions including stress response,   acute inflammation, trauma and pregnancy. Correlation with other   laboratory and clinical findings is essential.      Lymph # 05/25/2020 1.7  1.0 - 4.8 K/uL Final    Mono # 05/25/2020 0.4  0.3 - 1.0 K/uL Final    Eos # 05/25/2020 0.1  0.0 - 0.5 K/uL Final    Baso # 05/25/2020 0.02  0.00 - 0.20 K/uL Final    nRBC 05/25/2020 0  0 /100 WBC Final    Gran% 05/25/2020 49.3  38.0 - 73.0 % Final    Lymph% 05/25/2020 39.5  18.0 - 48.0 % Final    Mono% 05/25/2020 8.2  4.0 - 15.0 % Final    Eosinophil% 05/25/2020 2.3  0.0 - 8.0 % Final    Basophil% 05/25/2020 0.5  0.0 - 1.9 % Final    Differential Method 05/25/2020 Automated   Final    TSH 05/25/2020 1.600  0.400 - 4.000 uIU/mL Final    PSA, SCREEN 05/25/2020 2.1  0.00 - 4.00 ng/mL Final    Comment: PSA Expected levels:  Hormonal Therapy: <0.05 ng/ml  Prostatectomy: <0.01 ng/ml  Radiation Therapy: <1.00 ng/ml         Results for orders placed or performed in visit on 05/25/20   Urinalysis   Result Value Ref Range    Specimen UA Urine, Unspecified     Color, UA Yellow Yellow, Straw, Jennifer    Appearance, UA Hazy (A) Clear    pH, UA 5.0 5.0 - 8.0    Specific Gravity, UA 1.025 1.005 - 1.030    Protein, UA Negative Negative    Glucose, UA Negative Negative    Ketones, UA Negative Negative    Bilirubin (UA) Negative Negative    Occult Blood  UA Negative Negative    Nitrite, UA Negative Negative    Leukocytes, UA Negative Negative       Assessment:       1. Well adult exam    2. Hyperlipidemia, unspecified hyperlipidemia type        Plan:     Linda was seen today for annual exam.  The patient has been encouraged to lose weight and increase his exercise level.    Diagnoses and all orders for this visit:    Well adult exam    Hyperlipidemia, unspecified hyperlipidemia type

## 2020-08-24 ENCOUNTER — OFFICE VISIT (OUTPATIENT)
Dept: INTERNAL MEDICINE | Facility: CLINIC | Age: 55
End: 2020-08-24
Payer: COMMERCIAL

## 2020-08-24 VITALS
HEIGHT: 71 IN | BODY MASS INDEX: 30.15 KG/M2 | SYSTOLIC BLOOD PRESSURE: 132 MMHG | HEART RATE: 88 BPM | DIASTOLIC BLOOD PRESSURE: 82 MMHG | WEIGHT: 215.38 LBS | TEMPERATURE: 97 F | RESPIRATION RATE: 16 BRPM

## 2020-08-24 DIAGNOSIS — B96.89 BACTERIAL SINUSITIS: ICD-10-CM

## 2020-08-24 DIAGNOSIS — J32.9 BACTERIAL SINUSITIS: ICD-10-CM

## 2020-08-24 DIAGNOSIS — H10.31 ACUTE BACTERIAL CONJUNCTIVITIS OF RIGHT EYE: ICD-10-CM

## 2020-08-24 DIAGNOSIS — H10.31 ACUTE BACTERIAL CONJUNCTIVITIS OF RIGHT EYE: Primary | ICD-10-CM

## 2020-08-24 PROCEDURE — 3008F PR BODY MASS INDEX (BMI) DOCUMENTED: ICD-10-PCS | Mod: CPTII,S$GLB,, | Performed by: HOSPITALIST

## 2020-08-24 PROCEDURE — 99213 OFFICE O/P EST LOW 20 MIN: CPT | Mod: S$GLB,,, | Performed by: HOSPITALIST

## 2020-08-24 PROCEDURE — 3008F BODY MASS INDEX DOCD: CPT | Mod: CPTII,S$GLB,, | Performed by: HOSPITALIST

## 2020-08-24 PROCEDURE — 99213 PR OFFICE/OUTPT VISIT, EST, LEVL III, 20-29 MIN: ICD-10-PCS | Mod: S$GLB,,, | Performed by: HOSPITALIST

## 2020-08-24 PROCEDURE — 99999 PR PBB SHADOW E&M-EST. PATIENT-LVL III: CPT | Mod: PBBFAC,,, | Performed by: HOSPITALIST

## 2020-08-24 PROCEDURE — 99999 PR PBB SHADOW E&M-EST. PATIENT-LVL III: ICD-10-PCS | Mod: PBBFAC,,, | Performed by: HOSPITALIST

## 2020-08-24 RX ORDER — AZITHROMYCIN 250 MG/1
TABLET, FILM COATED ORAL
Qty: 6 TABLET | Refills: 0 | Status: SHIPPED | OUTPATIENT
Start: 2020-08-24 | End: 2020-08-24 | Stop reason: SDUPTHER

## 2020-08-24 RX ORDER — CIPROFLOXACIN HYDROCHLORIDE 3 MG/ML
SOLUTION/ DROPS OPHTHALMIC
Qty: 10 ML | Refills: 0 | Status: SHIPPED | OUTPATIENT
Start: 2020-08-24 | End: 2020-08-24 | Stop reason: SDUPTHER

## 2020-08-24 RX ORDER — AZITHROMYCIN 250 MG/1
TABLET, FILM COATED ORAL
Qty: 6 TABLET | Refills: 0 | Status: SHIPPED | OUTPATIENT
Start: 2020-08-24 | End: 2020-08-29

## 2020-08-24 RX ORDER — CIPROFLOXACIN HYDROCHLORIDE 3 MG/ML
SOLUTION/ DROPS OPHTHALMIC
Qty: 10 ML | Refills: 0 | Status: SHIPPED | OUTPATIENT
Start: 2020-08-24 | End: 2021-06-07 | Stop reason: ALTCHOICE

## 2020-08-24 NOTE — PROGRESS NOTES
"  Subjective:     @Patient ID: Linda Mobley is a 55 y.o. male.    Chief Complaint: Eye Pain (right) and Belepharitis (right)    HPI   54 yo male presents for urgent visit with 1 day hx of R eye re. . Denies crust or drainage. Endorse pain. Is sensitive to light. Denies scratching or rubbing   No f/c. Some drainage   Also Endorses x 1 month runny nose, cough , sinus pressure x 1 month ago. Has tried alovert. Reports hx of chronic rhinitis. However sx not improving      Review of Systems   Constitutional: Negative for chills and fever.   HENT: Positive for congestion, rhinorrhea and sinus pressure. Negative for sore throat.    Eyes: Positive for pain, discharge and redness. Negative for visual disturbance.   Respiratory: Negative for cough and shortness of breath.    Cardiovascular: Negative for chest pain and leg swelling.   Gastrointestinal: Negative for abdominal pain, nausea and vomiting.   Musculoskeletal: Negative for arthralgias and back pain.   Neurological: Negative for weakness and headaches.   Psychiatric/Behavioral: Negative for agitation and confusion.     Past medical history, surgical history, and family medical history reviewed and updated as appropriate.    Medications and allergies reviewed.     Objective:     Vitals:    08/24/20 0858   BP: 132/82   BP Location: Left arm   Patient Position: Sitting   BP Method: Medium (Manual)   Pulse: 88   Resp: 16   Temp: 97.3 °F (36.3 °C)   TempSrc: Temporal   Weight: 97.7 kg (215 lb 6.2 oz)   Height: 5' 10.5" (1.791 m)     Body mass index is 30.47 kg/m².  Physical Exam  Vitals signs reviewed.   Constitutional:       General: He is not in acute distress.     Appearance: He is well-developed.   HENT:      Head: Normocephalic and atraumatic.      Right Ear: Tympanic membrane normal.      Left Ear: Tympanic membrane normal.      Mouth/Throat:      Pharynx: No oropharyngeal exudate.   Eyes:      General:         Right eye: Discharge present.         Left eye: No " discharge.      Pupils: Pupils are equal, round, and reactive to light.      Comments: + red conjunctivae - R and upper R eyelid swelling    Neck:      Musculoskeletal: Normal range of motion and neck supple.   Cardiovascular:      Rate and Rhythm: Normal rate and regular rhythm.      Heart sounds: Normal heart sounds. No murmur. No friction rub.   Pulmonary:      Effort: Pulmonary effort is normal.      Breath sounds: Normal breath sounds.   Musculoskeletal: Normal range of motion.   Lymphadenopathy:      Cervical: No cervical adenopathy.   Skin:     General: Skin is warm and dry.   Neurological:      Mental Status: He is alert and oriented to person, place, and time.   Psychiatric:         Mood and Affect: Mood normal.         Behavior: Behavior normal.         Lab Results   Component Value Date    WBC 4.38 05/25/2020    HGB 13.8 (L) 05/25/2020    HCT 42.9 05/25/2020     05/25/2020    CHOL 187 05/25/2020    TRIG 69 05/25/2020    HDL 55 05/25/2020    ALT 14 05/25/2020    AST 15 05/25/2020     05/25/2020    K 4.0 05/25/2020     05/25/2020    CREATININE 1.0 05/25/2020    BUN 11 05/25/2020    CO2 24 05/25/2020    TSH 1.600 05/25/2020    PSA 2.1 05/25/2020       Assessment:     1. Acute bacterial conjunctivitis of right eye    2. Bacterial sinusitis      Plan:   Linda was seen today for eye pain and belepharitis.    Diagnoses and all orders for this visit:    Acute bacterial conjunctivitis of right eye  - Pt counseled on handwashing hygiene to prevent spread of infection  -     ciprofloxacin HCl (CILOXAN) 0.3 % ophthalmic solution; Instill 1 to 2 drops into Right eye every 2 hours while awake for 2 days and 1 to 2 drops every 4 hours while awake for the next 5 days    Bacterial sinusitis  -     azithromycin (Z-GISSELL) 250 MG tablet; Take 2 tablets by mouth on day 1; Take 1 tablet by mouth on days 2-5          No follow-ups on file.    Sapna Rosales MD  Internal Medicine    8/24/2020

## 2020-08-24 NOTE — TELEPHONE ENCOUNTER
----- Message from Temitope Carla sent at 8/24/2020 10:39 AM CDT -----  Contact: Pt [phone]  Please send the medication that was sent to [facility] today to [facility]/pharmacy #[id number] - [place] - [address] AT [facility] [phone] (Phone)  [phone] (Fax). Because [facility] is cloased.    Please call and advise.    Thank You

## 2020-08-24 NOTE — TELEPHONE ENCOUNTER
----- Message from Luanne Wong sent at 8/24/2020 10:14 AM CDT -----  Contact: Linda [phone]  Would like to receive medical advice.    Symptoms (please be specific):  azithromycin (Z-GISSELL) 250 MG tablet and ciprofloxacin HCl (CILOXAN) 0.3 % ophthalmic solution    Pharmacy name/number (copy/paste from chart):  [facility] [address] [place] [address] [phone]    Would they like a call back or a response via [facility]:  Call back     Additional information:  Calling to speak nurse pt needs medication sent to the above pharmacy original [facility] pharmacy is closed. The pt is requesting a call back states he is on his way to [facility]

## 2021-01-05 ENCOUNTER — TELEPHONE (OUTPATIENT)
Dept: INTERNAL MEDICINE | Facility: CLINIC | Age: 56
End: 2021-01-05

## 2021-01-05 DIAGNOSIS — Z00.00 WELL ADULT EXAM: Primary | ICD-10-CM

## 2021-05-31 ENCOUNTER — LAB VISIT (OUTPATIENT)
Dept: LAB | Facility: HOSPITAL | Age: 56
End: 2021-05-31
Payer: COMMERCIAL

## 2021-05-31 DIAGNOSIS — Z00.00 WELL ADULT EXAM: ICD-10-CM

## 2021-05-31 LAB
25(OH)D3+25(OH)D2 SERPL-MCNC: 39 NG/ML (ref 30–96)
ALBUMIN SERPL BCP-MCNC: 4 G/DL (ref 3.5–5.2)
ALP SERPL-CCNC: 55 U/L (ref 55–135)
ALT SERPL W/O P-5'-P-CCNC: 15 U/L (ref 10–44)
ANION GAP SERPL CALC-SCNC: 11 MMOL/L (ref 8–16)
AST SERPL-CCNC: 19 U/L (ref 10–40)
BASOPHILS # BLD AUTO: 0.03 K/UL (ref 0–0.2)
BASOPHILS NFR BLD: 0.4 % (ref 0–1.9)
BILIRUB SERPL-MCNC: 0.4 MG/DL (ref 0.1–1)
BUN SERPL-MCNC: 15 MG/DL (ref 6–20)
CALCIUM SERPL-MCNC: 9.8 MG/DL (ref 8.7–10.5)
CHLORIDE SERPL-SCNC: 103 MMOL/L (ref 95–110)
CHOLEST SERPL-MCNC: 183 MG/DL (ref 120–199)
CHOLEST/HDLC SERPL: 3.2 {RATIO} (ref 2–5)
CO2 SERPL-SCNC: 24 MMOL/L (ref 23–29)
COMPLEXED PSA SERPL-MCNC: 2.8 NG/ML (ref 0–4)
CREAT SERPL-MCNC: 1.1 MG/DL (ref 0.5–1.4)
DIFFERENTIAL METHOD: NORMAL
EOSINOPHIL # BLD AUTO: 0.1 K/UL (ref 0–0.5)
EOSINOPHIL NFR BLD: 2 % (ref 0–8)
ERYTHROCYTE [DISTWIDTH] IN BLOOD BY AUTOMATED COUNT: 13.2 % (ref 11.5–14.5)
EST. GFR  (AFRICAN AMERICAN): >60 ML/MIN/1.73 M^2
EST. GFR  (NON AFRICAN AMERICAN): >60 ML/MIN/1.73 M^2
ESTIMATED AVG GLUCOSE: 114 MG/DL (ref 68–131)
GLUCOSE SERPL-MCNC: 89 MG/DL (ref 70–110)
HBA1C MFR BLD: 5.6 % (ref 4–5.6)
HCT VFR BLD AUTO: 43 % (ref 40–54)
HDLC SERPL-MCNC: 58 MG/DL (ref 40–75)
HDLC SERPL: 31.7 % (ref 20–50)
HGB BLD-MCNC: 14 G/DL (ref 14–18)
IMM GRANULOCYTES # BLD AUTO: 0.01 K/UL (ref 0–0.04)
IMM GRANULOCYTES NFR BLD AUTO: 0.1 % (ref 0–0.5)
LDLC SERPL CALC-MCNC: 111 MG/DL (ref 63–159)
LYMPHOCYTES # BLD AUTO: 1.8 K/UL (ref 1–4.8)
LYMPHOCYTES NFR BLD: 26.7 % (ref 18–48)
MCH RBC QN AUTO: 29.5 PG (ref 27–31)
MCHC RBC AUTO-ENTMCNC: 32.6 G/DL (ref 32–36)
MCV RBC AUTO: 91 FL (ref 82–98)
MONOCYTES # BLD AUTO: 0.7 K/UL (ref 0.3–1)
MONOCYTES NFR BLD: 10.1 % (ref 4–15)
NEUTROPHILS # BLD AUTO: 4.2 K/UL (ref 1.8–7.7)
NEUTROPHILS NFR BLD: 60.7 % (ref 38–73)
NONHDLC SERPL-MCNC: 125 MG/DL
NRBC BLD-RTO: 0 /100 WBC
PLATELET # BLD AUTO: 240 K/UL (ref 150–450)
PMV BLD AUTO: 9.5 FL (ref 9.2–12.9)
POTASSIUM SERPL-SCNC: 4.1 MMOL/L (ref 3.5–5.1)
PROT SERPL-MCNC: 8.1 G/DL (ref 6–8.4)
RBC # BLD AUTO: 4.75 M/UL (ref 4.6–6.2)
SODIUM SERPL-SCNC: 138 MMOL/L (ref 136–145)
TRIGL SERPL-MCNC: 70 MG/DL (ref 30–150)
TSH SERPL DL<=0.005 MIU/L-ACNC: 2.86 UIU/ML (ref 0.4–4)
WBC # BLD AUTO: 6.86 K/UL (ref 3.9–12.7)

## 2021-05-31 PROCEDURE — 80061 LIPID PANEL: CPT | Performed by: INTERNAL MEDICINE

## 2021-05-31 PROCEDURE — 82306 VITAMIN D 25 HYDROXY: CPT | Performed by: INTERNAL MEDICINE

## 2021-05-31 PROCEDURE — 83036 HEMOGLOBIN GLYCOSYLATED A1C: CPT | Performed by: INTERNAL MEDICINE

## 2021-05-31 PROCEDURE — 84443 ASSAY THYROID STIM HORMONE: CPT | Performed by: INTERNAL MEDICINE

## 2021-05-31 PROCEDURE — 80053 COMPREHEN METABOLIC PANEL: CPT | Performed by: INTERNAL MEDICINE

## 2021-05-31 PROCEDURE — 36415 COLL VENOUS BLD VENIPUNCTURE: CPT | Mod: PO | Performed by: INTERNAL MEDICINE

## 2021-05-31 PROCEDURE — 84153 ASSAY OF PSA TOTAL: CPT | Performed by: INTERNAL MEDICINE

## 2021-05-31 PROCEDURE — 85025 COMPLETE CBC W/AUTO DIFF WBC: CPT | Performed by: INTERNAL MEDICINE

## 2021-06-07 ENCOUNTER — OFFICE VISIT (OUTPATIENT)
Dept: INTERNAL MEDICINE | Facility: CLINIC | Age: 56
End: 2021-06-07
Payer: COMMERCIAL

## 2021-06-07 VITALS
WEIGHT: 227.31 LBS | RESPIRATION RATE: 16 BRPM | DIASTOLIC BLOOD PRESSURE: 76 MMHG | SYSTOLIC BLOOD PRESSURE: 120 MMHG | HEART RATE: 76 BPM | HEIGHT: 70 IN | BODY MASS INDEX: 32.54 KG/M2 | TEMPERATURE: 98 F

## 2021-06-07 DIAGNOSIS — J31.0 CHRONIC RHINITIS: ICD-10-CM

## 2021-06-07 DIAGNOSIS — E78.49 OTHER HYPERLIPIDEMIA: ICD-10-CM

## 2021-06-07 DIAGNOSIS — Z00.00 WELL ADULT EXAM: Primary | ICD-10-CM

## 2021-06-07 PROCEDURE — 3008F BODY MASS INDEX DOCD: CPT | Mod: CPTII,S$GLB,, | Performed by: INTERNAL MEDICINE

## 2021-06-07 PROCEDURE — 99999 PR PBB SHADOW E&M-EST. PATIENT-LVL III: CPT | Mod: PBBFAC,,, | Performed by: INTERNAL MEDICINE

## 2021-06-07 PROCEDURE — 99396 PREV VISIT EST AGE 40-64: CPT | Mod: S$GLB,,, | Performed by: INTERNAL MEDICINE

## 2021-06-07 PROCEDURE — 3008F PR BODY MASS INDEX (BMI) DOCUMENTED: ICD-10-PCS | Mod: CPTII,S$GLB,, | Performed by: INTERNAL MEDICINE

## 2021-06-07 PROCEDURE — 1126F AMNT PAIN NOTED NONE PRSNT: CPT | Mod: S$GLB,,, | Performed by: INTERNAL MEDICINE

## 2021-06-07 PROCEDURE — 99999 PR PBB SHADOW E&M-EST. PATIENT-LVL III: ICD-10-PCS | Mod: PBBFAC,,, | Performed by: INTERNAL MEDICINE

## 2021-06-07 PROCEDURE — 1126F PR PAIN SEVERITY QUANTIFIED, NO PAIN PRESENT: ICD-10-PCS | Mod: S$GLB,,, | Performed by: INTERNAL MEDICINE

## 2021-06-07 PROCEDURE — 99396 PR PREVENTIVE VISIT,EST,40-64: ICD-10-PCS | Mod: S$GLB,,, | Performed by: INTERNAL MEDICINE

## 2021-06-07 RX ORDER — CLOSTRIDIUM TETANI TOXOID ANTIGEN (FORMALDEHYDE INACTIVATED), CORYNEBACTERIUM DIPHTHERIAE TOXOID ANTIGEN (FORMALDEHYDE INACTIVATED), BORDETELLA PERTUSSIS TOXOID ANTIGEN (GLUTARALDEHYDE INACTIVATED), BORDETELLA PERTUSSIS FILAMENTOUS HEMAGGLUTININ ANTIGEN (FORMALDEHYDE INACTIVATED), BORDETELLA PERTUSSIS PERTACTIN ANTIGEN, AND BORDETELLA PERTUSSIS FIMBRIAE 2/3 ANTIGEN 5; 2; 2.5; 5; 3; 5 [LF]/.5ML; [LF]/.5ML; UG/.5ML; UG/.5ML; UG/.5ML; UG/.5ML
0.5 INJECTION, SUSPENSION INTRAMUSCULAR ONCE
Qty: 0.5 ML | Refills: 0 | Status: SHIPPED | OUTPATIENT
Start: 2021-06-07 | End: 2021-06-07

## 2022-04-20 ENCOUNTER — TELEPHONE (OUTPATIENT)
Dept: INTERNAL MEDICINE | Facility: CLINIC | Age: 57
End: 2022-04-20
Payer: COMMERCIAL

## 2022-04-20 DIAGNOSIS — Z00.00 WELL ADULT EXAM: Primary | ICD-10-CM

## 2022-04-20 NOTE — TELEPHONE ENCOUNTER
Orders entered for lab appt 1 week before visit in June.    Left ms to schedule with phone staff. Blood and urine orders are entered.

## 2022-06-09 ENCOUNTER — LAB VISIT (OUTPATIENT)
Dept: LAB | Facility: HOSPITAL | Age: 57
End: 2022-06-09
Attending: INTERNAL MEDICINE
Payer: COMMERCIAL

## 2022-06-09 ENCOUNTER — OFFICE VISIT (OUTPATIENT)
Dept: INTERNAL MEDICINE | Facility: CLINIC | Age: 57
End: 2022-06-09
Payer: COMMERCIAL

## 2022-06-09 VITALS
WEIGHT: 230.38 LBS | HEIGHT: 70 IN | SYSTOLIC BLOOD PRESSURE: 144 MMHG | OXYGEN SATURATION: 97 % | DIASTOLIC BLOOD PRESSURE: 84 MMHG | HEART RATE: 77 BPM | RESPIRATION RATE: 16 BRPM | TEMPERATURE: 96 F | BODY MASS INDEX: 32.98 KG/M2

## 2022-06-09 DIAGNOSIS — R03.0 BLOOD PRESSURE ELEVATED WITHOUT HISTORY OF HTN: ICD-10-CM

## 2022-06-09 DIAGNOSIS — J31.0 CHRONIC RHINITIS: ICD-10-CM

## 2022-06-09 DIAGNOSIS — E66.9 OBESITY, UNSPECIFIED CLASSIFICATION, UNSPECIFIED OBESITY TYPE, UNSPECIFIED WHETHER SERIOUS COMORBIDITY PRESENT: ICD-10-CM

## 2022-06-09 DIAGNOSIS — Z00.00 WELL ADULT EXAM: Primary | ICD-10-CM

## 2022-06-09 DIAGNOSIS — E78.49 OTHER HYPERLIPIDEMIA: ICD-10-CM

## 2022-06-09 DIAGNOSIS — Z00.00 WELL ADULT EXAM: ICD-10-CM

## 2022-06-09 LAB
25(OH)D3+25(OH)D2 SERPL-MCNC: 34 NG/ML (ref 30–96)
ALBUMIN SERPL BCP-MCNC: 4.2 G/DL (ref 3.5–5.2)
ALP SERPL-CCNC: 49 U/L (ref 55–135)
ALT SERPL W/O P-5'-P-CCNC: 18 U/L (ref 10–44)
ANION GAP SERPL CALC-SCNC: 12 MMOL/L (ref 8–16)
AST SERPL-CCNC: 19 U/L (ref 10–40)
BASOPHILS # BLD AUTO: 0.03 K/UL (ref 0–0.2)
BASOPHILS NFR BLD: 0.6 % (ref 0–1.9)
BILIRUB SERPL-MCNC: 0.6 MG/DL (ref 0.1–1)
BUN SERPL-MCNC: 12 MG/DL (ref 6–20)
CALCIUM SERPL-MCNC: 9.4 MG/DL (ref 8.7–10.5)
CHLORIDE SERPL-SCNC: 102 MMOL/L (ref 95–110)
CHOLEST SERPL-MCNC: 189 MG/DL (ref 120–199)
CHOLEST/HDLC SERPL: 3.8 {RATIO} (ref 2–5)
CO2 SERPL-SCNC: 22 MMOL/L (ref 23–29)
COMPLEXED PSA SERPL-MCNC: 4.9 NG/ML (ref 0–4)
CREAT SERPL-MCNC: 1.1 MG/DL (ref 0.5–1.4)
DIFFERENTIAL METHOD: ABNORMAL
EOSINOPHIL # BLD AUTO: 0.1 K/UL (ref 0–0.5)
EOSINOPHIL NFR BLD: 2.1 % (ref 0–8)
ERYTHROCYTE [DISTWIDTH] IN BLOOD BY AUTOMATED COUNT: 13.2 % (ref 11.5–14.5)
EST. GFR  (AFRICAN AMERICAN): >60 ML/MIN/1.73 M^2
EST. GFR  (NON AFRICAN AMERICAN): >60 ML/MIN/1.73 M^2
ESTIMATED AVG GLUCOSE: 114 MG/DL (ref 68–131)
GLUCOSE SERPL-MCNC: 91 MG/DL (ref 70–110)
HBA1C MFR BLD: 5.6 % (ref 4–5.6)
HCT VFR BLD AUTO: 44.8 % (ref 40–54)
HDLC SERPL-MCNC: 50 MG/DL (ref 40–75)
HDLC SERPL: 26.5 % (ref 20–50)
HGB BLD-MCNC: 14.6 G/DL (ref 14–18)
IMM GRANULOCYTES # BLD AUTO: 0.01 K/UL (ref 0–0.04)
IMM GRANULOCYTES NFR BLD AUTO: 0.2 % (ref 0–0.5)
LDLC SERPL CALC-MCNC: 123.2 MG/DL (ref 63–159)
LYMPHOCYTES # BLD AUTO: 2.2 K/UL (ref 1–4.8)
LYMPHOCYTES NFR BLD: 41.5 % (ref 18–48)
MCH RBC QN AUTO: 31.1 PG (ref 27–31)
MCHC RBC AUTO-ENTMCNC: 32.6 G/DL (ref 32–36)
MCV RBC AUTO: 96 FL (ref 82–98)
MONOCYTES # BLD AUTO: 0.5 K/UL (ref 0.3–1)
MONOCYTES NFR BLD: 9.9 % (ref 4–15)
NEUTROPHILS # BLD AUTO: 2.5 K/UL (ref 1.8–7.7)
NEUTROPHILS NFR BLD: 45.7 % (ref 38–73)
NONHDLC SERPL-MCNC: 139 MG/DL
NRBC BLD-RTO: 0 /100 WBC
PLATELET # BLD AUTO: 236 K/UL (ref 150–450)
PMV BLD AUTO: 9.8 FL (ref 9.2–12.9)
POTASSIUM SERPL-SCNC: 4.5 MMOL/L (ref 3.5–5.1)
PROT SERPL-MCNC: 7.4 G/DL (ref 6–8.4)
RBC # BLD AUTO: 4.69 M/UL (ref 4.6–6.2)
SODIUM SERPL-SCNC: 136 MMOL/L (ref 136–145)
TRIGL SERPL-MCNC: 79 MG/DL (ref 30–150)
TSH SERPL DL<=0.005 MIU/L-ACNC: 2.23 UIU/ML (ref 0.4–4)
WBC # BLD AUTO: 5.35 K/UL (ref 3.9–12.7)

## 2022-06-09 PROCEDURE — 83036 HEMOGLOBIN GLYCOSYLATED A1C: CPT | Performed by: INTERNAL MEDICINE

## 2022-06-09 PROCEDURE — 99999 PR PBB SHADOW E&M-EST. PATIENT-LVL IV: CPT | Mod: PBBFAC,,, | Performed by: INTERNAL MEDICINE

## 2022-06-09 PROCEDURE — 84153 ASSAY OF PSA TOTAL: CPT | Performed by: INTERNAL MEDICINE

## 2022-06-09 PROCEDURE — 99396 PR PREVENTIVE VISIT,EST,40-64: ICD-10-PCS | Mod: S$GLB,,, | Performed by: INTERNAL MEDICINE

## 2022-06-09 PROCEDURE — 3077F PR MOST RECENT SYSTOLIC BLOOD PRESSURE >= 140 MM HG: ICD-10-PCS | Mod: CPTII,S$GLB,, | Performed by: INTERNAL MEDICINE

## 2022-06-09 PROCEDURE — 3008F PR BODY MASS INDEX (BMI) DOCUMENTED: ICD-10-PCS | Mod: CPTII,S$GLB,, | Performed by: INTERNAL MEDICINE

## 2022-06-09 PROCEDURE — 3044F PR MOST RECENT HEMOGLOBIN A1C LEVEL <7.0%: ICD-10-PCS | Mod: CPTII,S$GLB,, | Performed by: INTERNAL MEDICINE

## 2022-06-09 PROCEDURE — 1160F PR REVIEW ALL MEDS BY PRESCRIBER/CLIN PHARMACIST DOCUMENTED: ICD-10-PCS | Mod: CPTII,S$GLB,, | Performed by: INTERNAL MEDICINE

## 2022-06-09 PROCEDURE — 99396 PREV VISIT EST AGE 40-64: CPT | Mod: S$GLB,,, | Performed by: INTERNAL MEDICINE

## 2022-06-09 PROCEDURE — 80053 COMPREHEN METABOLIC PANEL: CPT | Performed by: INTERNAL MEDICINE

## 2022-06-09 PROCEDURE — 99999 PR PBB SHADOW E&M-EST. PATIENT-LVL IV: ICD-10-PCS | Mod: PBBFAC,,, | Performed by: INTERNAL MEDICINE

## 2022-06-09 PROCEDURE — 3077F SYST BP >= 140 MM HG: CPT | Mod: CPTII,S$GLB,, | Performed by: INTERNAL MEDICINE

## 2022-06-09 PROCEDURE — 36415 COLL VENOUS BLD VENIPUNCTURE: CPT | Mod: PO | Performed by: INTERNAL MEDICINE

## 2022-06-09 PROCEDURE — 3079F PR MOST RECENT DIASTOLIC BLOOD PRESSURE 80-89 MM HG: ICD-10-PCS | Mod: CPTII,S$GLB,, | Performed by: INTERNAL MEDICINE

## 2022-06-09 PROCEDURE — 1159F PR MEDICATION LIST DOCUMENTED IN MEDICAL RECORD: ICD-10-PCS | Mod: CPTII,S$GLB,, | Performed by: INTERNAL MEDICINE

## 2022-06-09 PROCEDURE — 85025 COMPLETE CBC W/AUTO DIFF WBC: CPT | Performed by: INTERNAL MEDICINE

## 2022-06-09 PROCEDURE — 84443 ASSAY THYROID STIM HORMONE: CPT | Performed by: INTERNAL MEDICINE

## 2022-06-09 PROCEDURE — 3008F BODY MASS INDEX DOCD: CPT | Mod: CPTII,S$GLB,, | Performed by: INTERNAL MEDICINE

## 2022-06-09 PROCEDURE — 80061 LIPID PANEL: CPT | Performed by: INTERNAL MEDICINE

## 2022-06-09 PROCEDURE — 1160F RVW MEDS BY RX/DR IN RCRD: CPT | Mod: CPTII,S$GLB,, | Performed by: INTERNAL MEDICINE

## 2022-06-09 PROCEDURE — 1159F MED LIST DOCD IN RCRD: CPT | Mod: CPTII,S$GLB,, | Performed by: INTERNAL MEDICINE

## 2022-06-09 PROCEDURE — 3079F DIAST BP 80-89 MM HG: CPT | Mod: CPTII,S$GLB,, | Performed by: INTERNAL MEDICINE

## 2022-06-09 PROCEDURE — 3044F HG A1C LEVEL LT 7.0%: CPT | Mod: CPTII,S$GLB,, | Performed by: INTERNAL MEDICINE

## 2022-06-09 PROCEDURE — 82306 VITAMIN D 25 HYDROXY: CPT | Performed by: INTERNAL MEDICINE

## 2022-06-09 NOTE — PROGRESS NOTES
Subjective:       Patient ID: Linda Mobley is a 57 y.o. male.    Chief Complaint: Annual Exam    HPI   The patient presents for annual physical examination.  Patient reports he is doing well.  He does have chronic rhinitis manifested by congestion and postnasal drainage.  Watery eyes are also often experienced.  Other active medical conditions include hyperlipidemia and obesity.  The patient does not exercise regularly.  He is resting well at night.    Immunization record was reviewed.    Screening tests were reviewed.    No interval change in past medical history, family history, or social history since prior evaluations.    Review of Systems   Constitutional: Negative for activity change, appetite change, chills, fatigue, fever and unexpected weight change.   HENT: Positive for nasal congestion and postnasal drip. Negative for ear pain and nosebleeds.    Eyes: Negative for pain, redness, itching and visual disturbance.   Respiratory: Negative for cough, chest tightness, shortness of breath and wheezing.    Cardiovascular: Negative for chest pain, palpitations and leg swelling.   Gastrointestinal: Negative for abdominal pain, blood in stool, constipation, nausea and vomiting.   Genitourinary: Negative for difficulty urinating, dysuria, frequency, hematuria and urgency.   Musculoskeletal: Negative for arthralgias, back pain, gait problem, joint swelling, myalgias, neck pain and neck stiffness.   Integumentary:  Negative for color change and rash.   Neurological: Negative for dizziness, seizures, syncope, weakness, light-headedness, numbness and headaches.   Hematological: Does not bruise/bleed easily.   Psychiatric/Behavioral: Negative for agitation, confusion, hallucinations and sleep disturbance. The patient is not nervous/anxious.             Physical Exam  Constitutional:       General: He is not in acute distress.     Appearance: He is well-developed.   HENT:      Head: Normocephalic and atraumatic.       Right Ear: External ear normal.      Left Ear: External ear normal.      Mouth/Throat:      Pharynx: No oropharyngeal exudate.   Eyes:      General: No scleral icterus.     Conjunctiva/sclera: Conjunctivae normal.      Pupils: Pupils are equal, round, and reactive to light.   Neck:      Thyroid: No thyromegaly.      Vascular: No JVD.   Cardiovascular:      Rate and Rhythm: Normal rate and regular rhythm.      Heart sounds: Normal heart sounds. No murmur heard.    No friction rub. No gallop.   Pulmonary:      Effort: Pulmonary effort is normal. No respiratory distress.      Breath sounds: Normal breath sounds. No wheezing or rales.   Abdominal:      General: Bowel sounds are normal.      Palpations: Abdomen is soft. There is no mass.      Tenderness: There is no abdominal tenderness.   Genitourinary:     Penis: Normal.       Comments:   No inguinal hernia.  No testicular masses.  Musculoskeletal:         General: No tenderness. Normal range of motion.      Cervical back: Normal range of motion and neck supple.   Lymphadenopathy:      Cervical: No cervical adenopathy.   Skin:     General: Skin is warm and dry.      Findings: No rash.   Neurological:      Mental Status: He is alert and oriented to person, place, and time.      Cranial Nerves: No cranial nerve deficit.      Motor: No abnormal muscle tone.   Psychiatric:         Mood and Affect: Mood normal.         Behavior: Behavior normal.           Lab Visit on 06/09/2022   Component Date Value Ref Range Status    Sodium 06/09/2022 136  136 - 145 mmol/L Final    Potassium 06/09/2022 4.5  3.5 - 5.1 mmol/L Final    Chloride 06/09/2022 102  95 - 110 mmol/L Final    CO2 06/09/2022 22 (A) 23 - 29 mmol/L Final    Glucose 06/09/2022 91  70 - 110 mg/dL Final    BUN 06/09/2022 12  6 - 20 mg/dL Final    Creatinine 06/09/2022 1.1  0.5 - 1.4 mg/dL Final    Calcium 06/09/2022 9.4  8.7 - 10.5 mg/dL Final    Total Protein 06/09/2022 7.4  6.0 - 8.4 g/dL Final    Albumin  06/09/2022 4.2  3.5 - 5.2 g/dL Final    Total Bilirubin 06/09/2022 0.6  0.1 - 1.0 mg/dL Final    Comment: For infants and newborns, interpretation of results should be based  on gestational age, weight and in agreement with clinical  observations.    Premature Infant recommended reference ranges:  Up to 24 hours.............<8.0 mg/dL  Up to 48 hours............<12.0 mg/dL  3-5 days..................<15.0 mg/dL  6-29 days.................<15.0 mg/dL      Alkaline Phosphatase 06/09/2022 49 (A) 55 - 135 U/L Final    AST 06/09/2022 19  10 - 40 U/L Final    ALT 06/09/2022 18  10 - 44 U/L Final    Anion Gap 06/09/2022 12  8 - 16 mmol/L Final    eGFR if African American 06/09/2022 >60.0  >60 mL/min/1.73 m^2 Final    eGFR if non African American 06/09/2022 >60.0  >60 mL/min/1.73 m^2 Final    Comment: Calculation used to obtain the estimated glomerular filtration  rate (eGFR) is the CKD-EPI equation.       Cholesterol 06/09/2022 189  120 - 199 mg/dL Final    Comment: The National Cholesterol Education Program (NCEP) has set the  following guidelines (reference ranges) for Cholesterol:  Optimal.....................<200 mg/dL  Borderline High.............200-239 mg/dL  High........................> or = 240 mg/dL      Triglycerides 06/09/2022 79  30 - 150 mg/dL Final    Comment: The National Cholesterol Education Program (NCEP) has set the  following guidelines (reference values) for triglycerides:  Normal......................<150 mg/dL  Borderline High.............150-199 mg/dL  High........................200-499 mg/dL      HDL 06/09/2022 50  40 - 75 mg/dL Final    Comment: The National Cholesterol Education Program (NCEP) has set the  following guidelines (reference values) for HDL Cholesterol:  Low...............<40 mg/dL  Optimal...........>60 mg/dL      LDL Cholesterol 06/09/2022 123.2  63.0 - 159.0 mg/dL Final    Comment: The National Cholesterol Education Program (NCEP) has set the  following guidelines  (reference values) for LDL Cholesterol:  Optimal.......................<130 mg/dL  Borderline High...............130-159 mg/dL  High..........................160-189 mg/dL  Very High.....................>190 mg/dL      HDL/Cholesterol Ratio 06/09/2022 26.5  20.0 - 50.0 % Final    Total Cholesterol/HDL Ratio 06/09/2022 3.8  2.0 - 5.0 Final    Non-HDL Cholesterol 06/09/2022 139  mg/dL Final    Comment: Risk category and Non-HDL cholesterol goals:  Coronary heart disease (CHD)or equivalent (10-year risk of CHD >20%):  Non-HDL cholesterol goal     <130 mg/dL  Two or more CHD risk factors and 10-year risk of CHD <= 20%:  Non-HDL cholesterol goal     <160 mg/dL  0 to 1 CHD risk factor:  Non-HDL cholesterol goal     <190 mg/dL      WBC 06/09/2022 5.35  3.90 - 12.70 K/uL Final    RBC 06/09/2022 4.69  4.60 - 6.20 M/uL Final    Hemoglobin 06/09/2022 14.6  14.0 - 18.0 g/dL Final    Hematocrit 06/09/2022 44.8  40.0 - 54.0 % Final    MCV 06/09/2022 96  82 - 98 fL Final    MCH 06/09/2022 31.1 (A) 27.0 - 31.0 pg Final    MCHC 06/09/2022 32.6  32.0 - 36.0 g/dL Final    RDW 06/09/2022 13.2  11.5 - 14.5 % Final    Platelets 06/09/2022 236  150 - 450 K/uL Final    MPV 06/09/2022 9.8  9.2 - 12.9 fL Final    Immature Granulocytes 06/09/2022 0.2  0.0 - 0.5 % Final    Gran # (ANC) 06/09/2022 2.5  1.8 - 7.7 K/uL Final    Immature Grans (Abs) 06/09/2022 0.01  0.00 - 0.04 K/uL Final    Comment: Mild elevation in immature granulocytes is non specific and   can be seen in a variety of conditions including stress response,   acute inflammation, trauma and pregnancy. Correlation with other   laboratory and clinical findings is essential.      Lymph # 06/09/2022 2.2  1.0 - 4.8 K/uL Final    Mono # 06/09/2022 0.5  0.3 - 1.0 K/uL Final    Eos # 06/09/2022 0.1  0.0 - 0.5 K/uL Final    Baso # 06/09/2022 0.03  0.00 - 0.20 K/uL Final    nRBC 06/09/2022 0  0 /100 WBC Final    Gran % 06/09/2022 45.7  38.0 - 73.0 % Final    Lymph %  06/09/2022 41.5  18.0 - 48.0 % Final    Mono % 06/09/2022 9.9  4.0 - 15.0 % Final    Eosinophil % 06/09/2022 2.1  0.0 - 8.0 % Final    Basophil % 06/09/2022 0.6  0.0 - 1.9 % Final    Differential Method 06/09/2022 Automated   Final    TSH 06/09/2022 2.227  0.400 - 4.000 uIU/mL Final    PSA, Screen 06/09/2022 4.9 (A) 0.00 - 4.00 ng/mL Final    Comment: The testing method is a chemiluminescent microparticle immunoassay   manufactured by [employer] and performed on the [employer]   or   [employer] system. Values obtained with different assay manufacturers   for   methods may be different and cannot be used interchangeably.  PSA Expected levels:  Hormonal Therapy: <0.05 ng/ml  Prostatectomy: <0.01 ng/ml  Radiation Therapy: <1.00 ng/ml      Vit D, 25-Hydroxy 06/09/2022 34  30 - 96 ng/mL Final    Comment: Vitamin D deficiency.........<10 ng/mL                              Vitamin D insufficiency......10-29 ng/mL       Vitamin D sufficiency........> or equal to 30 ng/mL  Vitamin D toxicity............>100 ng/mL      Hemoglobin A1C 06/09/2022 5.6  4.0 - 5.6 % Final    Comment: ADA Screening Guidelines:  5.7-6.4%  Consistent with prediabetes  >or=6.5%  Consistent with diabetes    High levels of fetal hemoglobin interfere with the HbA1C  assay. Heterozygous hemoglobin variants (HbS, HgC, etc)do  not significantly interfere with this assay.   However, presence of multiple variants may affect accuracy.      Estimated Avg Glucose 06/09/2022 114  68 - 131 mg/dL Final   Lab Visit on 06/09/2022   Component Date Value Ref Range Status    Specimen UA 06/09/2022 Urine, Clean Catch   Final    Color, UA 06/09/2022 Yellow  Yellow, Straw, Jennifer Final    Appearance, UA 06/09/2022 Clear  Clear Final    pH, UA 06/09/2022 5.0  5.0 - 8.0 Final    Specific [place], UA 06/09/2022 1.015  1.005 - 1.030 Final    Protein, UA 06/09/2022 Negative  Negative Final    Comment: Recommend a 24 hour urine protein or a urine    protein/creatinine ratio if globulin induced proteinuria is  clinically suspected.      Glucose, UA 06/09/2022 Negative  Negative Final    Ketones, UA 06/09/2022 Negative  Negative Final    Bilirubin (UA) 06/09/2022 Negative  Negative Final    Occult Blood UA 06/09/2022 1+ (A) Negative Final    Nitrite, UA 06/09/2022 Negative  Negative Final    Leukocytes, UA 06/09/2022 Negative  Negative Final    RBC, UA 06/09/2022 0  0 - 4 /hpf Final    WBC, UA 06/09/2022 1  0 - 5 /hpf Final    Bacteria 06/09/2022 Rare  None-Occ /hpf Final    Microscopic Comment 06/09/2022 SEE COMMENT   Final    Comment: Other formed elements not mentioned in the report are not   present in the microscopic examination.          Assessment & Plan:      Linda was seen today for annual exam.  We discussed obtaining the adult Tdap booster vaccine through his local pharmacy.    A blood pressure recheck in 2-3 weeks by the nursing staff is recommended.    Await lab results drawn today.    Diagnoses and all orders for this visit:    Well adult exam    Other hyperlipidemia    Chronic rhinitis    Blood pressure elevated without history of HTN    Obesity, unspecified classification, unspecified obesity type, unspecified whether serious comorbidity present         Follow up in about 3 weeks (around 6/30/2022).     Flavio Colunga MD

## 2022-06-27 ENCOUNTER — CLINICAL SUPPORT (OUTPATIENT)
Dept: INTERNAL MEDICINE | Facility: CLINIC | Age: 57
End: 2022-06-27
Payer: COMMERCIAL

## 2022-06-27 VITALS — DIASTOLIC BLOOD PRESSURE: 70 MMHG | SYSTOLIC BLOOD PRESSURE: 128 MMHG | HEART RATE: 68 BPM

## 2022-06-27 DIAGNOSIS — Z00.00 WELL ADULT EXAM: ICD-10-CM

## 2022-06-27 DIAGNOSIS — E78.49 OTHER HYPERLIPIDEMIA: Primary | ICD-10-CM

## 2022-06-27 PROCEDURE — 99999 PR PBB SHADOW E&M-EST. PATIENT-LVL I: CPT | Mod: PBBFAC,,,

## 2022-06-27 PROCEDURE — 99999 PR PBB SHADOW E&M-EST. PATIENT-LVL I: ICD-10-PCS | Mod: PBBFAC,,,

## 2022-07-18 ENCOUNTER — PATIENT MESSAGE (OUTPATIENT)
Dept: INTERNAL MEDICINE | Facility: CLINIC | Age: 57
End: 2022-07-18
Payer: COMMERCIAL

## 2022-07-18 DIAGNOSIS — R97.20 ELEVATED PSA: Primary | ICD-10-CM

## 2022-09-20 ENCOUNTER — IMMUNIZATION (OUTPATIENT)
Dept: FAMILY MEDICINE | Facility: CLINIC | Age: 57
End: 2022-09-20
Payer: COMMERCIAL

## 2022-09-20 DIAGNOSIS — Z23 NEED FOR VACCINATION: Primary | ICD-10-CM

## 2022-09-20 PROCEDURE — 91312 COVID-19, MRNA, LNP-S, BIVALENT BOOSTER, PF, 30 MCG/0.3 ML DOSE: CPT | Mod: S$GLB,,, | Performed by: FAMILY MEDICINE

## 2022-09-20 PROCEDURE — 91312 COVID-19, MRNA, LNP-S, BIVALENT BOOSTER, PF, 30 MCG/0.3 ML DOSE: ICD-10-PCS | Mod: S$GLB,,, | Performed by: FAMILY MEDICINE

## 2022-09-20 PROCEDURE — 0124A COVID-19, MRNA, LNP-S, BIVALENT BOOSTER, PF, 30 MCG/0.3 ML DOSE: CPT | Mod: PBBFAC | Performed by: FAMILY MEDICINE

## 2022-09-21 ENCOUNTER — LAB VISIT (OUTPATIENT)
Dept: LAB | Facility: HOSPITAL | Age: 57
End: 2022-09-21
Attending: INTERNAL MEDICINE
Payer: COMMERCIAL

## 2022-09-21 DIAGNOSIS — R97.20 ELEVATED PSA: ICD-10-CM

## 2022-09-21 LAB — COMPLEXED PSA SERPL-MCNC: 2.7 NG/ML (ref 0–4)

## 2022-09-21 PROCEDURE — 84153 ASSAY OF PSA TOTAL: CPT | Performed by: INTERNAL MEDICINE

## 2022-09-21 PROCEDURE — 36415 COLL VENOUS BLD VENIPUNCTURE: CPT | Mod: PO | Performed by: INTERNAL MEDICINE

## 2023-05-24 ENCOUNTER — PATIENT OUTREACH (OUTPATIENT)
Dept: ADMINISTRATIVE | Facility: HOSPITAL | Age: 58
End: 2023-05-24
Payer: COMMERCIAL

## 2023-06-05 ENCOUNTER — PATIENT MESSAGE (OUTPATIENT)
Dept: INTERNAL MEDICINE | Facility: CLINIC | Age: 58
End: 2023-06-05
Payer: COMMERCIAL

## 2023-06-05 DIAGNOSIS — Z00.00 WELL ADULT EXAM: Primary | ICD-10-CM

## 2023-06-13 ENCOUNTER — OFFICE VISIT (OUTPATIENT)
Dept: INTERNAL MEDICINE | Facility: CLINIC | Age: 58
End: 2023-06-13
Payer: COMMERCIAL

## 2023-06-13 ENCOUNTER — LAB VISIT (OUTPATIENT)
Dept: LAB | Facility: HOSPITAL | Age: 58
End: 2023-06-13
Attending: INTERNAL MEDICINE
Payer: COMMERCIAL

## 2023-06-13 VITALS
BODY MASS INDEX: 34.22 KG/M2 | OXYGEN SATURATION: 97 % | RESPIRATION RATE: 18 BRPM | WEIGHT: 239 LBS | HEART RATE: 78 BPM | DIASTOLIC BLOOD PRESSURE: 78 MMHG | TEMPERATURE: 98 F | HEIGHT: 70 IN | SYSTOLIC BLOOD PRESSURE: 126 MMHG

## 2023-06-13 DIAGNOSIS — Z00.00 WELL ADULT EXAM: ICD-10-CM

## 2023-06-13 DIAGNOSIS — J31.0 CHRONIC RHINITIS: ICD-10-CM

## 2023-06-13 DIAGNOSIS — E78.49 OTHER HYPERLIPIDEMIA: ICD-10-CM

## 2023-06-13 DIAGNOSIS — D12.6 ADENOMATOUS POLYP OF COLON, UNSPECIFIED PART OF COLON: ICD-10-CM

## 2023-06-13 DIAGNOSIS — Z00.00 WELL ADULT EXAM: Primary | ICD-10-CM

## 2023-06-13 DIAGNOSIS — E66.9 OBESITY, UNSPECIFIED CLASSIFICATION, UNSPECIFIED OBESITY TYPE, UNSPECIFIED WHETHER SERIOUS COMORBIDITY PRESENT: ICD-10-CM

## 2023-06-13 DIAGNOSIS — Z12.11 COLON CANCER SCREENING: ICD-10-CM

## 2023-06-13 LAB — COMPLEXED PSA SERPL-MCNC: 2.4 NG/ML (ref 0–4)

## 2023-06-13 PROCEDURE — 3078F DIAST BP <80 MM HG: CPT | Mod: CPTII,S$GLB,, | Performed by: INTERNAL MEDICINE

## 2023-06-13 PROCEDURE — 3044F HG A1C LEVEL LT 7.0%: CPT | Mod: CPTII,S$GLB,, | Performed by: INTERNAL MEDICINE

## 2023-06-13 PROCEDURE — 3078F PR MOST RECENT DIASTOLIC BLOOD PRESSURE < 80 MM HG: ICD-10-PCS | Mod: CPTII,S$GLB,, | Performed by: INTERNAL MEDICINE

## 2023-06-13 PROCEDURE — 84153 ASSAY OF PSA TOTAL: CPT | Performed by: INTERNAL MEDICINE

## 2023-06-13 PROCEDURE — 3044F PR MOST RECENT HEMOGLOBIN A1C LEVEL <7.0%: ICD-10-PCS | Mod: CPTII,S$GLB,, | Performed by: INTERNAL MEDICINE

## 2023-06-13 PROCEDURE — 1159F PR MEDICATION LIST DOCUMENTED IN MEDICAL RECORD: ICD-10-PCS | Mod: CPTII,S$GLB,, | Performed by: INTERNAL MEDICINE

## 2023-06-13 PROCEDURE — 36415 COLL VENOUS BLD VENIPUNCTURE: CPT | Mod: PO | Performed by: INTERNAL MEDICINE

## 2023-06-13 PROCEDURE — 99999 PR PBB SHADOW E&M-EST. PATIENT-LVL IV: ICD-10-PCS | Mod: PBBFAC,,, | Performed by: INTERNAL MEDICINE

## 2023-06-13 PROCEDURE — 99396 PR PREVENTIVE VISIT,EST,40-64: ICD-10-PCS | Mod: S$GLB,,, | Performed by: INTERNAL MEDICINE

## 2023-06-13 PROCEDURE — 3008F PR BODY MASS INDEX (BMI) DOCUMENTED: ICD-10-PCS | Mod: CPTII,S$GLB,, | Performed by: INTERNAL MEDICINE

## 2023-06-13 PROCEDURE — 3008F BODY MASS INDEX DOCD: CPT | Mod: CPTII,S$GLB,, | Performed by: INTERNAL MEDICINE

## 2023-06-13 PROCEDURE — 3074F PR MOST RECENT SYSTOLIC BLOOD PRESSURE < 130 MM HG: ICD-10-PCS | Mod: CPTII,S$GLB,, | Performed by: INTERNAL MEDICINE

## 2023-06-13 PROCEDURE — 99396 PREV VISIT EST AGE 40-64: CPT | Mod: S$GLB,,, | Performed by: INTERNAL MEDICINE

## 2023-06-13 PROCEDURE — 99999 PR PBB SHADOW E&M-EST. PATIENT-LVL IV: CPT | Mod: PBBFAC,,, | Performed by: INTERNAL MEDICINE

## 2023-06-13 PROCEDURE — 1159F MED LIST DOCD IN RCRD: CPT | Mod: CPTII,S$GLB,, | Performed by: INTERNAL MEDICINE

## 2023-06-13 PROCEDURE — 3074F SYST BP LT 130 MM HG: CPT | Mod: CPTII,S$GLB,, | Performed by: INTERNAL MEDICINE

## 2023-06-13 PROCEDURE — 1160F PR REVIEW ALL MEDS BY PRESCRIBER/CLIN PHARMACIST DOCUMENTED: ICD-10-PCS | Mod: CPTII,S$GLB,, | Performed by: INTERNAL MEDICINE

## 2023-06-13 PROCEDURE — 1160F RVW MEDS BY RX/DR IN RCRD: CPT | Mod: CPTII,S$GLB,, | Performed by: INTERNAL MEDICINE

## 2023-07-24 NOTE — PROGRESS NOTES
Subjective:       Patient ID: Linda Mobley is a 58 y.o. male.    Chief Complaint: Annual Exam    HPI  The patient presents for annual physical examination.  The patient reports he is doing well overall.  Intermittent rhinitis symptoms are noted.  He has been using however or other OTC preparations for rhinitis symptoms.  Occasional back pain is noted but does not require medication.  It has not limited his level of physical activity.  He does not exercise regularly.  His sleep quality is variable.  Active medical conditions include hyperlipidemia, chronic rhinitis, and obesity.  He also has a personal history of colon polyps.  He is due for screening colonoscopy.    Immunization record was reviewed.      Screening tests were reviewed.  His last colonoscopy was on 08/31/2018.  Adenomatous colon polyp has been documented.  A follow-up examination in 5 years was recommended.    No interval change in past medical history family history, or social history since prior evaluations.    Review of Systems   Constitutional:  Negative for activity change, appetite change, chills, fatigue, fever and unexpected weight change.   HENT:  Positive for postnasal drip and rhinorrhea. Negative for nasal congestion, ear pain and nosebleeds.    Eyes:  Negative for pain, redness, itching and visual disturbance.   Respiratory:  Negative for cough, chest tightness, shortness of breath and wheezing.    Cardiovascular:  Negative for chest pain, palpitations and leg swelling.   Gastrointestinal:  Negative for abdominal pain, blood in stool, constipation, nausea and vomiting.   Genitourinary:  Negative for difficulty urinating, dysuria, frequency, hematuria and urgency.   Musculoskeletal:  Positive for back pain. Negative for arthralgias, gait problem, joint swelling, myalgias, neck pain and neck stiffness.   Integumentary:  Negative for color change and rash.   Neurological:  Negative for dizziness, seizures, syncope, weakness,  light-headedness, numbness and headaches.   Hematological:  Does not bruise/bleed easily.   Psychiatric/Behavioral:  Negative for agitation, confusion, hallucinations and sleep disturbance. The patient is not nervous/anxious.           Physical Exam  Vitals and nursing note reviewed.   Constitutional:       General: He is not in acute distress.     Appearance: Normal appearance. He is well-developed.   HENT:      Head: Normocephalic and atraumatic.      Right Ear: External ear normal.      Left Ear: External ear normal.      Mouth/Throat:      Pharynx: Oropharynx is clear. No oropharyngeal exudate.   Eyes:      General: No scleral icterus.     Extraocular Movements: Extraocular movements intact.      Conjunctiva/sclera: Conjunctivae normal.   Neck:      Thyroid: No thyromegaly.      Vascular: No JVD.   Cardiovascular:      Rate and Rhythm: Normal rate and regular rhythm.      Pulses: Normal pulses.      Heart sounds: Normal heart sounds. No murmur heard.    No friction rub. No gallop.   Pulmonary:      Effort: Pulmonary effort is normal. No respiratory distress.      Breath sounds: Normal breath sounds. No wheezing or rales.   Abdominal:      General: Bowel sounds are normal. There is no distension.      Palpations: Abdomen is soft. There is no mass.      Tenderness: There is no abdominal tenderness. There is no guarding.   Musculoskeletal:         General: No tenderness. Normal range of motion.      Cervical back: Normal range of motion and neck supple.      Right lower leg: No edema.      Left lower leg: No edema.   Lymphadenopathy:      Cervical: No cervical adenopathy.   Skin:     General: Skin is warm and dry.      Findings: No rash.   Neurological:      General: No focal deficit present.      Mental Status: He is alert and oriented to person, place, and time.      Cranial Nerves: No cranial nerve deficit.      Motor: No abnormal muscle tone.      Deep Tendon Reflexes: Reflexes are normal and symmetric.    Psychiatric:         Behavior: Behavior normal.         Thought Content: Thought content normal.         Lab Visit on 06/09/2023   Component Date Value Ref Range Status    TSH 06/09/2023 2.400  0.400 - 4.000 uIU/mL Final    Comment: Warning:  Heterophilic antibodies in serum or plasma of   certain individuals are known to cause interference with   immunoassays. These antibodies may be present in blood samples   from individuals regularly exposed to animal or who have been   treated with animal products.     Patients taking high doses of supplemental biotin may have  negatively biased results.       Cholesterol 06/09/2023 215 (H)  120 - 199 mg/dL Final    Comment: The National Cholesterol Education Program (NCEP) has set the  following guidelines (reference ranges) for Cholesterol:  Optimal.....................<200 mg/dL  Borderline High.............200-239 mg/dL  High........................> or = 240 mg/dL      Triglycerides 06/09/2023 69  30 - 150 mg/dL Final    Comment: The National Cholesterol Education Program (NCEP) has set the  following guidelines (reference values) for triglycerides:  Normal......................<150 mg/dL  Borderline High.............150-199 mg/dL  High........................200-499 mg/dL      HDL 06/09/2023 61  40 - 75 mg/dL Final    Comment: The National Cholesterol Education Program (NCEP) has set the  following guidelines (reference values) for HDL Cholesterol:  Low...............<40 mg/dL  Optimal...........>60 mg/dL      LDL Cholesterol 06/09/2023 140.2  63.0 - 159.0 mg/dL Final    Comment: The National Cholesterol Education Program (NCEP) has set the  following guidelines (reference values) for LDL Cholesterol:  Optimal.......................<130 mg/dL  Borderline High...............130-159 mg/dL  High..........................160-189 mg/dL  Very High.....................>190 mg/dL      HDL/Cholesterol Ratio 06/09/2023 28.4  20.0 - 50.0 % Final    Total Cholesterol/HDL Ratio  06/09/2023 3.5  2.0 - 5.0 Final    Non-HDL Cholesterol 06/09/2023 154  mg/dL Final    Comment: Risk category and Non-HDL cholesterol goals:  Coronary heart disease (CHD)or equivalent (10-year risk of CHD >20%):  Non-HDL cholesterol goal     <130 mg/dL  Two or more CHD risk factors and 10-year risk of CHD <= 20%:  Non-HDL cholesterol goal     <160 mg/dL  0 to 1 CHD risk factor:  Non-HDL cholesterol goal     <190 mg/dL      Sodium 06/09/2023 139  136 - 145 mmol/L Final    Potassium 06/09/2023 3.9  3.5 - 5.1 mmol/L Final    Chloride 06/09/2023 105  95 - 110 mmol/L Final    CO2 06/09/2023 22 (L)  23 - 29 mmol/L Final    Glucose 06/09/2023 95  70 - 110 mg/dL Final    BUN 06/09/2023 12  2 - 20 mg/dL Final    Creatinine 06/09/2023 0.94  0.50 - 1.40 mg/dL Final    Calcium 06/09/2023 9.1  8.7 - 10.5 mg/dL Final    Total Protein 06/09/2023 7.8  6.0 - 8.4 g/dL Final    Albumin 06/09/2023 4.6  3.5 - 5.2 g/dL Final    Total Bilirubin 06/09/2023 0.6  0.1 - 1.0 mg/dL Final    Comment: For infants and newborns, interpretation of results should be based  on gestational age, weight and in agreement with clinical  observations.    Premature Infant recommended reference ranges:  Up to 24 hours.............<8.0 mg/dL  Up to 48 hours............<12.0 mg/dL  3-5 days..................<15.0 mg/dL  6-29 days.................<15.0 mg/dL      Alkaline Phosphatase 06/09/2023 44  38 - 126 U/L Final    AST 06/09/2023 26  15 - 46 U/L Final    ALT 06/09/2023 20  10 - 44 U/L Final    Anion Gap 06/09/2023 12  8 - 16 mmol/L Final    eGFR 06/09/2023 >60.0  >60 mL/min/1.73 m^2 Final    WBC 06/09/2023 4.26  3.90 - 12.70 K/uL Final    RBC 06/09/2023 4.83  4.60 - 6.20 M/uL Final    Hemoglobin 06/09/2023 14.5  14.0 - 18.0 g/dL Final    Hematocrit 06/09/2023 43.2  40.0 - 54.0 % Final    MCV 06/09/2023 89  82 - 98 fL Final    MCH 06/09/2023 30.0  27.0 - 31.0 pg Final    MCHC 06/09/2023 33.6  32.0 - 36.0 g/dL Final    RDW 06/09/2023 13.0  11.5 - 14.5 % Final     Platelets 06/09/2023 208  150 - 450 K/uL Final    MPV 06/09/2023 9.5  9.2 - 12.9 fL Final    Immature Granulocytes 06/09/2023 0.2  0.0 - 0.5 % Final    Gran # (ANC) 06/09/2023 2.2  1.8 - 7.7 K/uL Final    Immature Grans (Abs) 06/09/2023 0.01  0.00 - 0.04 K/uL Final    Comment: Mild elevation in immature granulocytes is non specific and   can be seen in a variety of conditions including stress response,   acute inflammation, trauma and pregnancy. Correlation with other   laboratory and clinical findings is essential.      Lymph # 06/09/2023 1.5  1.0 - 4.8 K/uL Final    Mono # 06/09/2023 0.4  0.3 - 1.0 K/uL Final    Eos # 06/09/2023 0.1  0.0 - 0.5 K/uL Final    Baso # 06/09/2023 0.03  0.00 - 0.20 K/uL Final    nRBC 06/09/2023 0  0 /100 WBC Final    Gran % 06/09/2023 52.6  38.0 - 73.0 % Final    Lymph % 06/09/2023 35.9  18.0 - 48.0 % Final    Mono % 06/09/2023 8.5  4.0 - 15.0 % Final    Eosinophil % 06/09/2023 2.1  0.0 - 8.0 % Final    Basophil % 06/09/2023 0.7  0.0 - 1.9 % Final    Differential Method 06/09/2023 Automated   Final    Hemoglobin A1C 06/09/2023 5.4  4.0 - 5.6 % Final    Comment: ADA Screening Guidelines:  5.7-6.4%  Consistent with prediabetes  >or=6.5%  Consistent with diabetes    High levels of fetal hemoglobin interfere with the HbA1C  assay. Heterozygous hemoglobin variants (HbS, HgC, etc)do  not significantly interfere with this assay.   However, presence of multiple variants may affect accuracy.      Estimated Avg Glucose 06/09/2023 108  68 - 131 mg/dL Final   Lab Visit on 06/09/2023   Component Date Value Ref Range Status    Specimen UA 06/09/2023 Urine, Clean Catch   Final    Color, UA 06/09/2023 Yellow  Yellow, Straw, Jennifer Final    Appearance, UA 06/09/2023 Clear  Clear Final    pH, UA 06/09/2023 5.0  5.0 - 8.0 Final    Specific Gravity, UA 06/09/2023 1.025  1.005 - 1.030 Final    Protein, UA 06/09/2023 Trace (A)  Negative Final    Comment: Recommend a 24 hour urine protein or a urine    protein/creatinine ratio if globulin induced proteinuria is  clinically suspected.      Glucose, UA 06/09/2023 Negative  Negative Final    Ketones, UA 06/09/2023 Negative  Negative Final    Bilirubin (UA) 06/09/2023 Negative  Negative Final    Occult Blood UA 06/09/2023 Trace (A)  Negative Final    Nitrite, UA 06/09/2023 Negative  Negative Final    Urobilinogen, UA 06/09/2023 Negative  <2.0 EU/dL Final    Leukocytes, UA 06/09/2023 Negative  Negative Final    RBC, UA 06/09/2023 1  0 - 4 /hpf Final    WBC, UA 06/09/2023 1  0 - 5 /hpf Final    Bacteria 06/09/2023 None  None-Occ /hpf Final    Microscopic Comment 06/09/2023 SEE COMMENT   Final    Comment: Other formed elements not mentioned in the report are not   present in the microscopic examination.          Assessment & Plan:      Linda was seen today for annual exam.  The patient has been encouraged to decrease fat intake in the diet and to lose weight.  He should increase his exercise level.    Screening colonoscopy will be ordered.    Screening PSA level will be obtained today.    Diagnoses and all orders for this visit:    Well adult exam  -     PSA, Screening; Future    Other hyperlipidemia    Chronic rhinitis    Obesity, unspecified classification, unspecified obesity type, unspecified whether serious comorbidity present    Colon cancer screening  -     Ambulatory referral/consult to Endo Procedure ; Future    Adenomatous polyp of colon, unspecified part of colon  -     Ambulatory referral/consult to Endo Procedure ; Future         Follow up in about 1 year (around 6/13/2024).     Flavio Colunga MD

## 2023-08-25 ENCOUNTER — PATIENT MESSAGE (OUTPATIENT)
Dept: ENDOSCOPY | Facility: HOSPITAL | Age: 58
End: 2023-08-25

## 2023-08-25 ENCOUNTER — CLINICAL SUPPORT (OUTPATIENT)
Dept: ENDOSCOPY | Facility: HOSPITAL | Age: 58
End: 2023-08-25
Attending: INTERNAL MEDICINE
Payer: COMMERCIAL

## 2023-08-25 ENCOUNTER — TELEPHONE (OUTPATIENT)
Dept: ENDOSCOPY | Facility: HOSPITAL | Age: 58
End: 2023-08-25

## 2023-08-25 DIAGNOSIS — D12.6 ADENOMATOUS POLYP OF COLON, UNSPECIFIED PART OF COLON: ICD-10-CM

## 2023-08-25 DIAGNOSIS — Z12.11 COLON CANCER SCREENING: ICD-10-CM

## 2023-08-25 DIAGNOSIS — Z12.11 SPECIAL SCREENING FOR MALIGNANT NEOPLASMS, COLON: Primary | ICD-10-CM

## 2023-08-25 NOTE — TELEPHONE ENCOUNTER
Spoke to patient to schedule procedure(s) Colonoscopy       Physician to perform procedure(s) Dr. ANGEL LUIS Juan  Date of Procedure (s) 9/26/23  Arrival Time 10:00 AM  Time of Procedure(s) 11:00 AM   Location of Procedure(s) [facility] [address]  Type of Rx Prep sent to patient: PEG  Instructions provided to patient via [facility]    Patient was informed on the following information and verbalized understanding. Screening questionnaire reviewed with patient and complete. If procedure requires anesthesia, a responsible adult needs to be present to accompany the patient home, patient cannot drive after receiving anesthesia. Appointment details are tentative, especially check-in time. Patient will receive a prep-op call 4 days prior to confirm check-in time for procedure. If applicable the patient should contact their pharmacy to verify Rx for procedure prep is ready for pick-up. Patient was advised to call the scheduling department at [phone] if pharmacy states no Rx is available. Patient was advised to call the endoscopy scheduling department if any questions or concerns arise.      [facility] Scheduling Department

## 2023-09-26 ENCOUNTER — HOSPITAL ENCOUNTER (OUTPATIENT)
Facility: HOSPITAL | Age: 58
Discharge: HOME | End: 2023-09-26
Attending: INTERNAL MEDICINE | Admitting: INTERNAL MEDICINE
Payer: COMMERCIAL

## 2023-09-26 ENCOUNTER — ANESTHESIA EVENT (OUTPATIENT)
Dept: ENDOSCOPY | Facility: HOSPITAL | Age: 58
End: 2023-09-26
Payer: COMMERCIAL

## 2023-09-26 ENCOUNTER — ANESTHESIA (OUTPATIENT)
Dept: ENDOSCOPY | Facility: HOSPITAL | Age: 58
End: 2023-09-26
Payer: COMMERCIAL

## 2023-09-26 VITALS
DIASTOLIC BLOOD PRESSURE: 72 MMHG | BODY MASS INDEX: 33.64 KG/M2 | HEIGHT: 70 IN | OXYGEN SATURATION: 99 % | SYSTOLIC BLOOD PRESSURE: 120 MMHG | HEART RATE: 63 BPM | RESPIRATION RATE: 16 BRPM | TEMPERATURE: 98 F | WEIGHT: 235 LBS

## 2023-09-26 DIAGNOSIS — Z86.010 HISTORY OF ADENOMATOUS POLYP OF COLON: ICD-10-CM

## 2023-09-26 PROCEDURE — 63600175 PHARM REV CODE 636 W HCPCS: Performed by: NURSE ANESTHETIST, CERTIFIED REGISTERED

## 2023-09-26 PROCEDURE — 88305 TISSUE EXAM BY PATHOLOGIST: CPT | Mod: 26,,, | Performed by: PATHOLOGY

## 2023-09-26 PROCEDURE — E9220 PRA ENDO ANESTHESIA: HCPCS | Mod: 33,,, | Performed by: NURSE ANESTHETIST, CERTIFIED REGISTERED

## 2023-09-26 PROCEDURE — 37000008 HC ANESTHESIA 1ST 15 MINUTES: Performed by: INTERNAL MEDICINE

## 2023-09-26 PROCEDURE — E9220 PRA ENDO ANESTHESIA: ICD-10-PCS | Mod: 33,,, | Performed by: NURSE ANESTHETIST, CERTIFIED REGISTERED

## 2023-09-26 PROCEDURE — 45385 COLONOSCOPY W/LESION REMOVAL: CPT | Mod: 33,,, | Performed by: INTERNAL MEDICINE

## 2023-09-26 PROCEDURE — 25000003 PHARM REV CODE 250: Performed by: NURSE ANESTHETIST, CERTIFIED REGISTERED

## 2023-09-26 PROCEDURE — 37000009 HC ANESTHESIA EA ADD 15 MINS: Performed by: INTERNAL MEDICINE

## 2023-09-26 PROCEDURE — 45380 COLONOSCOPY AND BIOPSY: CPT | Mod: PT,59 | Performed by: INTERNAL MEDICINE

## 2023-09-26 PROCEDURE — 45385 COLONOSCOPY W/LESION REMOVAL: CPT | Mod: PT | Performed by: INTERNAL MEDICINE

## 2023-09-26 PROCEDURE — 45385 PR COLONOSCOPY,REMV LESN,SNARE: ICD-10-PCS | Mod: 33,,, | Performed by: INTERNAL MEDICINE

## 2023-09-26 PROCEDURE — 88305 TISSUE EXAM BY PATHOLOGIST: ICD-10-PCS | Mod: 26,,, | Performed by: PATHOLOGY

## 2023-09-26 PROCEDURE — 45380 PR COLONOSCOPY,BIOPSY: ICD-10-PCS | Mod: 33,59,, | Performed by: INTERNAL MEDICINE

## 2023-09-26 PROCEDURE — 27201089 HC SNARE, DISP (ANY): Performed by: INTERNAL MEDICINE

## 2023-09-26 PROCEDURE — 88305 TISSUE EXAM BY PATHOLOGIST: CPT | Performed by: PATHOLOGY

## 2023-09-26 PROCEDURE — 45380 COLONOSCOPY AND BIOPSY: CPT | Mod: 33,59,, | Performed by: INTERNAL MEDICINE

## 2023-09-26 RX ORDER — PROPOFOL 10 MG/ML
VIAL (ML) INTRAVENOUS CONTINUOUS PRN
Status: DISCONTINUED | OUTPATIENT
Start: 2023-09-26 | End: 2023-09-26

## 2023-09-26 RX ORDER — SODIUM CHLORIDE 9 MG/ML
INJECTION, SOLUTION INTRAVENOUS CONTINUOUS
Status: CANCELLED | OUTPATIENT
Start: 2023-09-26

## 2023-09-26 RX ORDER — SODIUM CHLORIDE 9 MG/ML
INJECTION, SOLUTION INTRAVENOUS CONTINUOUS
Status: DISCONTINUED | OUTPATIENT
Start: 2023-09-26 | End: 2023-09-26 | Stop reason: HOSPADM

## 2023-09-26 RX ORDER — LIDOCAINE HYDROCHLORIDE 20 MG/ML
INJECTION INTRAVENOUS
Status: DISCONTINUED | OUTPATIENT
Start: 2023-09-26 | End: 2023-09-26

## 2023-09-26 RX ORDER — PROPOFOL 10 MG/ML
VIAL (ML) INTRAVENOUS
Status: DISCONTINUED | OUTPATIENT
Start: 2023-09-26 | End: 2023-09-26

## 2023-09-26 RX ADMIN — SODIUM CHLORIDE: 0.9 INJECTION, SOLUTION INTRAVENOUS at 10:09

## 2023-09-26 RX ADMIN — PROPOFOL 50 MG: 10 INJECTION, EMULSION INTRAVENOUS at 10:09

## 2023-09-26 RX ADMIN — PROPOFOL 150 MCG/KG/MIN: 10 INJECTION, EMULSION INTRAVENOUS at 10:09

## 2023-09-26 RX ADMIN — LIDOCAINE HYDROCHLORIDE 50 MG: 20 INJECTION INTRAVENOUS at 10:09

## 2023-09-26 NOTE — H&P
Short Stay Endoscopy History and Physical    PCP - Flavio Colunga MD    Procedure - Colonoscopy  ASA - per anesthesia  Mallampati - per anesthesia  History of Anesthesia problems - no  Family history Anesthesia problems - no   Plan of anesthesia - General    HPI:  This is a 58 y.o. male here for evaluation of : personal history of adenomatous colon polyps/surveillance colonoscopy.     ROS:  Constitutional: No fevers, chills, No weight loss  CV: No chest pain  Pulm: No cough, No shortness of breath  GI: see HPI  Derm: No rash    Medical History:  has a past medical history of Chronic rhinitis, Hyperlipidemia, Obesity, and Unspecified vitamin D deficiency.    Surgical History:  has a past surgical history that includes epigastric hernia repair (2010); achilles tendon repair; and Colonoscopy (N/A, 8/31/2018).    Family History: family history includes Breast cancer in his mother; Cancer in his paternal grandfather; Cancer (age of onset: 55) in his father; Diabetes in his paternal uncle; Hypertension in his father; Prostate cancer in his father.. Otherwise no colon cancer, inflammatory bowel disease, or GI malignancies.    Social History:  reports that he has been smoking cigars. He has never used smokeless tobacco. He reports that he does not drink alcohol and does not use drugs.    Review of patient's allergies indicates:   Allergen Reactions    Pcn [penicillins] Hives       Medications:   Medications Prior to Admission   Medication Sig Dispense Refill Last Dose    [id number]-[id number] (ADELITA MULTIVITAMIN FOR MEN) 200-175-250 mcg Tab Take 1 tablet by mouth once daily. (Patient not taking: Reported on 6/13/2023)            Physical Exam:    Vital Signs: There were no vitals filed for this visit.    General Appearance: Well appearing in no acute distress  Eyes:    No scleral icterus  ENT: Neck supple, Lips, mucosa, and tongue normal; teeth and gums normal  Abdomen: Soft, non tender, non distended with  positive bowel sounds. No hepatosplenomegaly, ascites, or mass.  Extremities: 2+ pulses, no clubbing, cyanosis or edema  Skin: No rash      Labs:  Lab Results   Component Value Date    WBC 4.26 06/09/2023    HGB 14.5 06/09/2023    HCT 43.2 06/09/2023     06/09/2023    CHOL 215 (H) 06/09/2023    TRIG 69 06/09/2023    HDL 61 06/09/2023    ALT 20 06/09/2023    AST 26 06/09/2023     06/09/2023    K 3.9 06/09/2023     06/09/2023    CREATININE 0.94 06/09/2023    BUN 12 06/09/2023    CO2 22 (L) 06/09/2023    TSH 2.400 06/09/2023    PSA 2.4 06/13/2023    HGBA1C 5.4 06/09/2023       I have explained the risks and benefits of endoscopy procedures to the patient including but not limited to bleeding, perforation, infection, and death.  The patient was asked if they understand and allowed to ask any further questions to their satisfaction.    Kayden Overton DO

## 2023-09-26 NOTE — TRANSFER OF CARE
"Anesthesia Transfer of Care Note    Patient: Linda Mobley    Procedure(s) Performed: Procedure(s) (LRB):  COLONOSCOPY (N/A)    Patient location: PACU    Anesthesia Type: general    Transport from OR: Transported from OR on room air with adequate spontaneous ventilation    Post pain: adequate analgesia    Post assessment: no apparent anesthetic complications    Post vital signs: stable    Level of consciousness: awake and alert    Nausea/Vomiting: no nausea/vomiting    Complications: none    Transfer of care protocol was followed      Last vitals:   Visit Vitals  /65   Pulse 86   Temp 36.7 °C (98 °F)   Resp 16   Ht 5' 10" (1.778 m)   Wt 106.6 kg (235 lb)   SpO2 99%   BMI 33.72 kg/m²     "

## 2023-09-26 NOTE — ANESTHESIA PREPROCEDURE EVALUATION
09/26/2023  Linda Mobley is a 58 y.o., male.  Patient Active Problem List   Diagnosis    Vitamin D deficiency    Hyperlipidemia    Chronic rhinitis    Obesity     Past Medical History:   Diagnosis Date    Chronic rhinitis     Hyperlipidemia     Obesity     Unspecified vitamin D deficiency      Past Surgical History:   Procedure Laterality Date    achilles tendon repair      right    COLONOSCOPY N/A 8/31/2018    Procedure: COLONOSCOPY;  Surgeon: Pablo Sanders MD;  Location: [address];  Service: Endoscopy;  Laterality: N/A;    epigastric hernia repair  2010    Incarcerated epigastric hernia.            Pre-op Assessment    I have reviewed the Patient Summary Reports.    I have reviewed the NPO Status.   I have reviewed the Medications.     Review of Systems  Anesthesia Hx:  No problems with previous Anesthesia    Hematology/Oncology:  Hematology Normal   Oncology Normal     EENT/Dental:EENT/Dental Normal   Cardiovascular:  Cardiovascular Normal     Pulmonary:  Pulmonary Normal    Renal/:  Renal/ Normal     Hepatic/GI:  Hepatic/GI Normal    Musculoskeletal:  Musculoskeletal Normal    Neurological:  Neurology Normal    Endocrine:  Endocrine Normal    Dermatological:  Skin Normal    Psych:  Psychiatric Normal           Physical Exam  General: Well nourished, Cooperative, Alert and Oriented    Airway:  Mallampati: II   Mouth Opening: Normal  TM Distance: Normal  Tongue: Normal  Neck ROM: Normal ROM    Dental:  Intact    Chest/Lungs:  Normal Respiratory Rate, Clear to auscultation    Heart:  Rate: Normal  Rhythm: Regular Rhythm  Sounds: Normal        Anesthesia Plan  Type of Anesthesia, risks & benefits discussed:    Anesthesia Type: Gen Natural Airway  Intra-op Monitoring Plan: Standard ASA Monitors  Post Op Pain Control Plan: multimodal analgesia  Induction:  IV  Informed Consent:  Informed consent signed with the Patient and all parties understand the risks and agree with anesthesia plan.  All questions answered.   ASA Score: 2  Day of Surgery Review of History & Physical: H&P Update referred to the surgeon/provider.    Ready For Surgery From Anesthesia Perspective.     .

## 2023-09-26 NOTE — [employer] PATIENT INSTRUCTIONS
Discharge Summary/Instructions after an Endoscopic Procedure  Patient Name: Linda Mobley  Patient MRN: [id number]  Patient YOB: 1965 Tuesday, September 26, 2023  Luis Felipe Juan MD  Dear patient,  As a result of recent federal legislation (The Federal Cures Act), you may   receive lab or pathology results from your procedure in your [facility]   account before your physician is able to contact you. Your physician or   their representative will relay the results to you with their   recommendations at their soonest availability.  Thank you,  RESTRICTIONS:  During your procedure today, you received medications for sedation.  These   medications may affect your judgment, balance and coordination.  Therefore,   for 24 hours, you have the following restrictions:   - DO NOT drive a car, operate machinery, make legal/financial decisions,   sign important papers or drink alcohol.    ACTIVITY:  Today: no heavy lifting, straining or running due to procedural   sedation/anesthesia.  The following day: return to full activity including work.  DIET:  Eat and drink normally unless instructed otherwise.     TREATMENT FOR COMMON SIDE EFFECTS:  - Mild abdominal pain, nausea, belching, bloating or excessive gas:  rest,   eat lightly and use a heating pad.  - Sore Throat: treat with throat lozenges and/or gargle with warm salt   water.  - Because air was used during the procedure, expelling large amounts of air   from your rectum or belching is normal.  - If a bowel prep was taken, you may not have a bowel movement for 1-3 days.    This is normal.  SYMPTOMS TO WATCH FOR AND REPORT TO YOUR PHYSICIAN:  1. Abdominal pain or bloating, other than gas cramps.  2. Chest pain.  3. Back pain.  4. Signs of infection such as: chills or fever occurring within 24 hours   after the procedure.  5. Rectal bleeding, which would show as bright red, maroon, or black stools.   (A tablespoon of blood from the rectum is not serious, especially if    hemorrhoids are present.)  6. Vomiting.  7. Weakness or dizziness.  GO DIRECTLY TO THE NEAREST EMERGENCY ROOM IF YOU HAVE ANY OF THE FOLLOWING:      Difficulty breathing              Chills and/or fever over 101 F   Persistent vomiting and/or vomiting blood   Severe abdominal pain   Severe chest pain   Black, tarry stools   Bleeding- more than one tablespoon   Any other symptom or condition that you feel may need urgent attention  Your doctor recommends these additional instructions:  If any biopsies were taken, your doctors clinic will contact you in 1 to 2   weeks with any results.  - Discharge patient to home (ambulatory).   - Patient has a contact number available for emergencies.  The signs and   symptoms of potential delayed complications were discussed with the   patient.  Return to normal activities tomorrow.  Written discharge   instructions were provided to the patient.   - Resume previous diet.   - Continue present medications.   - Return to primary care physician as previously scheduled.   - Repeat colonoscopy in 2 years for surveillance.  For questions, problems or results please call your physician - Luis Felipe Juan MD at Work:  [phone].  [facility], EMERGENCY ROOM PHONE NUMBER: [phone]  IF A COMPLICATION OR EMERGENCY SITUATION ARISES AND YOU ARE UNABLE TO REACH   YOUR PHYSICIAN - GO DIRECTLY TO THE EMERGENCY ROOM.  Luis Felipe Juan MD  9/26/2023 11:19:42 AM  This report has been verified and signed electronically.  Dear patient,  As a result of recent federal legislation (The Federal Cures Act), you may   receive lab or pathology results from your procedure in your [facility]   account before your physician is able to contact you. Your physician or   their representative will relay the results to you with their   recommendations at their soonest availability.  Thank you,  [employer]

## 2023-09-26 NOTE — ANESTHESIA POSTPROCEDURE EVALUATION
Anesthesia Post Evaluation    Patient: Linda Mobley    Procedure(s) Performed: Procedure(s) (LRB):  COLONOSCOPY (N/A)    Final Anesthesia Type: general      Patient location during evaluation: PACU  Patient participation: Yes- Able to Participate  Level of consciousness: awake and alert  Post-procedure vital signs: reviewed and stable  Pain management: adequate  Airway patency: patent    PONV status at discharge: No PONV  Anesthetic complications: no      Cardiovascular status: blood pressure returned to baseline  Respiratory status: spontaneous ventilation and room air  Hydration status: euvolemic  Follow-up not needed.          Vitals Value Taken Time   /72 09/26/23 1150   Temp 36.7 °C (98 °F) 09/26/23 1125   Pulse 63 09/26/23 1150   Resp 16 09/26/23 1150   SpO2 99 % 09/26/23 1150         Event Time   Out of Recovery 11:51:21         Pain/Devaughn Score: Devaughn Score: 10 (9/26/2023 11:25 AM)

## 2023-10-02 LAB
FINAL PATHOLOGIC DIAGNOSIS: NORMAL
GROSS: NORMAL
Lab: NORMAL

## 2023-10-03 ENCOUNTER — PATIENT MESSAGE (OUTPATIENT)
Dept: GASTROENTEROLOGY | Facility: HOSPITAL | Age: 58
End: 2023-10-03
Payer: COMMERCIAL

## 2024-06-27 DIAGNOSIS — Z00.00 WELL ADULT EXAM: Primary | ICD-10-CM

## 2024-06-29 ENCOUNTER — LAB VISIT (OUTPATIENT)
Dept: LAB | Facility: HOSPITAL | Age: 59
End: 2024-06-29
Attending: INTERNAL MEDICINE
Payer: COMMERCIAL

## 2024-06-29 DIAGNOSIS — Z00.00 WELL ADULT EXAM: ICD-10-CM

## 2024-06-29 LAB
BILIRUB UR QL STRIP: NEGATIVE
CLARITY UR REFRACT.AUTO: CLEAR
COLOR UR AUTO: YELLOW
GLUCOSE UR QL STRIP: NEGATIVE
HGB UR QL STRIP: ABNORMAL
KETONES UR QL STRIP: NEGATIVE
LEUKOCYTE ESTERASE UR QL STRIP: NEGATIVE
NITRITE UR QL STRIP: NEGATIVE
PH UR STRIP: 6 [PH] (ref 5–8)
PROT UR QL STRIP: ABNORMAL
SP GR UR STRIP: 1.02 (ref 1–1.03)
URN SPEC COLLECT METH UR: ABNORMAL

## 2024-06-29 PROCEDURE — 81003 URINALYSIS AUTO W/O SCOPE: CPT | Performed by: INTERNAL MEDICINE

## 2024-06-30 ENCOUNTER — TELEPHONE (OUTPATIENT)
Dept: INTERNAL MEDICINE | Facility: CLINIC | Age: 59
End: 2024-06-30
Payer: COMMERCIAL

## 2024-06-30 DIAGNOSIS — R97.20 ELEVATED PSA: Primary | ICD-10-CM

## 2024-06-30 NOTE — TELEPHONE ENCOUNTER
The PSA test result for prostate cancer screening was abnormally elevated at 5.1.  (Normal range is 0-4.0).  A consultation with urology is recommended for further evaluation.

## 2024-07-01 NOTE — TELEPHONE ENCOUNTER
He is already booked with urology.    He was booked out today w/ dr sherine juares.    I left msg on today to see if he can come 7/29 at  11;30.  That is only opening for July left unless  Someone cancels.   Spot held till hear bck.

## 2024-07-05 NOTE — PROGRESS NOTES
[facility]  Urologic Oncology Clinic Note        Date of Service: 2024      Chief Complaint/Reason for Consultation: Elevated PSA    Requesting Provider:   Clyde Colunga MD   [facility]  [place]  [place] [address]      History of Present Illness:   Patient 59 y.o. male presents with elevated PSA of 5.1. PSA usually near 2.8, but did have one value 2 years ago of 4.9       + family history of prostate cancer - grandfather  from prostate cancer. Couple uncles with prostate cancer, and younger cousin with prostate cancer.     Blood thinners: 81mg aspirin daily, 325mg aspirin daily, Lovenox, Eliquis, Xarelto, Clopidogrel/Plavix, Coumadin, Ticagrelor/Brilinta, and     No Hx of TIA, MI, and CVA   Abdominal surgeries: Hernia repair Lap hiatal hernia early .       Urologic History:     2022 -- PSA 4.9   2023 -- PSA 2.8   2024 -- PSA 5.1         Patient Active Problem List    Diagnosis Date Noted    Obesity 2019    Vitamin D deficiency     Hyperlipidemia     Chronic rhinitis         Review of patient's allergies indicates:   Allergen Reactions    Pcn [penicillins] Hives        Past Medical History:   Diagnosis Date    Chronic rhinitis     Hyperlipidemia     Obesity     Unspecified vitamin D deficiency       Past Surgical History:   Procedure Laterality Date    achilles tendon repair      right    COLONOSCOPY N/A 2018    Procedure: COLONOSCOPY;  Surgeon: Pablo Sanders MD;  Location: [address]);  Service: Endoscopy;  Laterality: N/A;    COLONOSCOPY N/A 2023    Procedure: COLONOSCOPY;  Surgeon: Luis Felipe Juan MD;  Location: [address]);  Service: Endoscopy;  Laterality: N/A;  peg prep-portal-clyde green-tb    epigastric hernia repair      Incarcerated epigastric hernia.         Family History   Problem Relation Name Age of Onset    Prostate cancer Father      Hypertension Father      Cancer Father  55        prostate    Breast  "cancer Mother      Diabetes Paternal Uncle      Cancer Paternal Grandfather          prostate      Social History     Tobacco Use    Smoking status: Some Days     Types: Cigars    Smokeless tobacco: Never    Tobacco comments:     occasional cigar use   Substance Use Topics    Alcohol use: No     Alcohol/week: 0.0 standard drinks of alcohol        Review of Systems   Constitutional:  Negative for activity change, fever and unexpected weight change.   HENT:  Negative for congestion.    Respiratory:  Negative for cough and shortness of breath.    Cardiovascular:  Negative for chest pain.   Gastrointestinal:  Negative for abdominal pain.   Genitourinary:  Negative for hematuria.             OBJECTIVE:     Vitals:    07/08/24 0821   BP: (!) 142/80   Pulse: 82   Weight: 108 kg (238 lb 1.6 oz)   Height: 5' 10" (1.778 m)          General Appearance: Alert, cooperative, no distress  Head: Normocephalic  Eyes: Clear conjunctiva  Neck: No obvious LND or JVD  Lungs: Normal chest excursion, no accessory muscle use  Chest: Regular rate rhythm by palpation, no pedal edema  Abdomen: Soft, non-tender, non-distended, surgical scars + lap, hernias none  ALEKSEY: Deferred  Extremities: Atraumatic   Lymph Nodes: No appreciable lymph adenopathy  Neurologic: No gross gait, motor or sensory deficits            LAB:      Lab Results   Component Value Date    PSA 5.1 (H) 06/29/2024    PSA 2.4 06/13/2023    PSA 4.9 (H) 06/09/2022           IMAGING:        No relevant imaging to review        ASSESSMENT/PLAN:     58 yo M with elevated PSA    Plan:    Elevated PSA  Today I informed the patient of incidence, risk factors, screening guidelines, and natural history of prostate cancer. Furthermore, we discussed the inaccuracies of PSA testing, rise due to size of the prostate gland or cancer, and the role of additional evaluation with various blood tests or prostate MRI that can be more informative in distinguishing elevated PSA attributed to benign " prostate hypertrophy vs cancer.      I told him that he is an high an approximately 40% incidence of any prostate cancer at biopsy and an approximately 18% of clinically significant prostate cancer.      To this end we have decided to order the patient a PHI (in one week ) and mpMRI of his prostate if PHI concerning and PSA still elevated.     We discussed that the mpMRI can survey the whole gland to search for abnormalities suggestive of prostate cancer and in some select cases can be used to avoid biopsy and in other cases can enhance the traditional biopsies methods by helping us perform a targeted biopsy.     In brief, I told him about how a prostate biopsy is performed per rectum with ultrasound guidance. I informed him of the risks of the procedure such as infection , blood per urine, blood per rectum , acute urinary retention , and acute loss of erectile function.    We did discuss possibly avoiding prostate biopsy if PSA returned back to baseline of 2.8        Abnormal UA  -Trace blood noted on last UA  -Will obtain urinanalysis and urine microscopy  -Possible work up for micro hematuria in the future    Lynda Soliz MD  Urology PGY 5      Attending Note:  I have personally seen and examined the patient and agree with the documentation and plan above as described by the resident.     Anthony Fitzgerald MD  Urologic Oncology  P: [phone]

## 2024-07-08 ENCOUNTER — OFFICE VISIT (OUTPATIENT)
Dept: UROLOGY | Facility: CLINIC | Age: 59
End: 2024-07-08
Payer: COMMERCIAL

## 2024-07-08 VITALS
SYSTOLIC BLOOD PRESSURE: 142 MMHG | HEIGHT: 70 IN | WEIGHT: 238.13 LBS | HEART RATE: 82 BPM | DIASTOLIC BLOOD PRESSURE: 80 MMHG | BODY MASS INDEX: 34.09 KG/M2

## 2024-07-08 DIAGNOSIS — R97.20 ELEVATED PSA: ICD-10-CM

## 2024-07-08 PROCEDURE — 3079F DIAST BP 80-89 MM HG: CPT | Mod: CPTII,S$GLB,, | Performed by: STUDENT IN AN ORGANIZED HEALTH CARE EDUCATION/TRAINING PROGRAM

## 2024-07-08 PROCEDURE — 1159F MED LIST DOCD IN RCRD: CPT | Mod: CPTII,S$GLB,, | Performed by: STUDENT IN AN ORGANIZED HEALTH CARE EDUCATION/TRAINING PROGRAM

## 2024-07-08 PROCEDURE — 3044F HG A1C LEVEL LT 7.0%: CPT | Mod: CPTII,S$GLB,, | Performed by: STUDENT IN AN ORGANIZED HEALTH CARE EDUCATION/TRAINING PROGRAM

## 2024-07-08 PROCEDURE — 3077F SYST BP >= 140 MM HG: CPT | Mod: CPTII,S$GLB,, | Performed by: STUDENT IN AN ORGANIZED HEALTH CARE EDUCATION/TRAINING PROGRAM

## 2024-07-08 PROCEDURE — 99204 OFFICE O/P NEW MOD 45 MIN: CPT | Mod: S$GLB,,, | Performed by: STUDENT IN AN ORGANIZED HEALTH CARE EDUCATION/TRAINING PROGRAM

## 2024-07-08 PROCEDURE — 3008F BODY MASS INDEX DOCD: CPT | Mod: CPTII,S$GLB,, | Performed by: STUDENT IN AN ORGANIZED HEALTH CARE EDUCATION/TRAINING PROGRAM

## 2024-07-08 PROCEDURE — 99999 PR PBB SHADOW E&M-EST. PATIENT-LVL III: CPT | Mod: PBBFAC,,, | Performed by: STUDENT IN AN ORGANIZED HEALTH CARE EDUCATION/TRAINING PROGRAM

## 2024-07-09 ENCOUNTER — TELEPHONE (OUTPATIENT)
Dept: INTERNAL MEDICINE | Facility: CLINIC | Age: 59
End: 2024-07-09
Payer: COMMERCIAL

## 2024-07-09 NOTE — TELEPHONE ENCOUNTER
----- Message from Ami Locke sent at 7/9/2024 10:10 AM CDT -----  Contact: Patient [phone]  Patient returning call to confirm July 29, Please call to confirm

## 2024-07-17 ENCOUNTER — TELEPHONE (OUTPATIENT)
Dept: UROLOGY | Facility: CLINIC | Age: 59
End: 2024-07-17
Payer: COMMERCIAL

## 2024-07-17 DIAGNOSIS — R31.29 MICROSCOPIC HEMATURIA: Primary | ICD-10-CM

## 2024-07-17 NOTE — TELEPHONE ENCOUNTER
Spoke with patient who was able to provide acceptable patient identifiers prior to start of conversation.   Appt's scheduled with patient.

## 2024-07-17 NOTE — PROGRESS NOTES
Orders only -- CT urogram and cystoscopy    Phone patient to inform him PSA was back down to baseline. Will cancel MRI prostate.    Discussed microscopic hematuria findings. Discussed work up and he would like to proceed.

## 2024-07-25 ENCOUNTER — PROCEDURE VISIT (OUTPATIENT)
Dept: UROLOGY | Facility: CLINIC | Age: 59
End: 2024-07-25
Payer: COMMERCIAL

## 2024-07-25 VITALS
DIASTOLIC BLOOD PRESSURE: 95 MMHG | WEIGHT: 238.13 LBS | TEMPERATURE: 97 F | BODY MASS INDEX: 34.16 KG/M2 | SYSTOLIC BLOOD PRESSURE: 144 MMHG | RESPIRATION RATE: 20 BRPM | HEART RATE: 83 BPM

## 2024-07-25 DIAGNOSIS — R31.29 MICROSCOPIC HEMATURIA: ICD-10-CM

## 2024-07-25 RX ORDER — LIDOCAINE HYDROCHLORIDE 20 MG/ML
JELLY TOPICAL
Status: COMPLETED | OUTPATIENT
Start: 2024-07-25 | End: 2024-07-25

## 2024-07-25 RX ADMIN — LIDOCAINE HYDROCHLORIDE: 20 JELLY TOPICAL at 02:07

## 2024-07-25 NOTE — PATIENT INSTRUCTIONS
What to Expect After a Cystoscopy  For the next 24-48 hours, you may feel a mild burning when you urinate. This burning is normal and expected. Drink plenty of water to dilute the urine to help relieve the burning sensation. You may also see a small amount of blood in your urine after the procedure.    Unless you are already taking antibiotics, you may be given an antibiotic after the test to prevent infection.    Signs and Symptoms to Report  Call the [facility] at [phone] if you develop any of the following:  Fever of 101 degrees or higher  Chills or persistent bleeding  Inability to urinate

## 2024-07-26 NOTE — PROCEDURES
Office Cystoscopy Procedure Note    Date of Procedure: 07/26/2024    Indication:   Hematuria       Informed consent:  The risks, benefits, complications, treatment options, and expected outcomes were discussed with the patient. The patient concurred with the proposed plan and provided informed consent.     Anesthesia: Lidocaine jelly 2%     Antibiotic prophylaxis: None     Procedure:  The patient was placed in the lithotomy position, was prepped and draped in the usual manner using sterile technique, and 2% lidocaine jelly instilled into the urethra.  A 17 F flexible cystoscope was then inserted into the urethra and the urethra and bladder carefully examined.  The following findings were noted:       Findings:   Urethra: normal     Prostate: enlarged, trilobar hypertrophy, median lobe w/ varices     Bladder: no lesions    Ureteral orifices:       -Right: Normal       -Left: Normal     Other findings:     None        Specimens: None                   Complications: None; patient tolerated the procedure well            Disposition: Home after brief observation     Condition: Stable     Plan: RTC PRN. Discussed hematuria likely from prostate in origin.    Attending Attestation:      I personally performed the procedure.       Anthony Fitzgerald MD  Urologic Oncology  P: [phone]

## 2024-07-29 ENCOUNTER — OFFICE VISIT (OUTPATIENT)
Dept: INTERNAL MEDICINE | Facility: CLINIC | Age: 59
End: 2024-07-29
Payer: COMMERCIAL

## 2024-07-29 ENCOUNTER — HOSPITAL ENCOUNTER (OUTPATIENT)
Dept: RADIOLOGY | Facility: HOSPITAL | Age: 59
Discharge: HOME | End: 2024-07-29
Attending: INTERNAL MEDICINE
Payer: COMMERCIAL

## 2024-07-29 VITALS
HEART RATE: 80 BPM | SYSTOLIC BLOOD PRESSURE: 130 MMHG | RESPIRATION RATE: 16 BRPM | DIASTOLIC BLOOD PRESSURE: 84 MMHG | HEIGHT: 70 IN | BODY MASS INDEX: 33.14 KG/M2 | WEIGHT: 231.5 LBS | TEMPERATURE: 98 F

## 2024-07-29 DIAGNOSIS — R68.83 CHILLS: ICD-10-CM

## 2024-07-29 DIAGNOSIS — E78.49 OTHER HYPERLIPIDEMIA: ICD-10-CM

## 2024-07-29 DIAGNOSIS — Z00.00 WELL ADULT EXAM: Primary | ICD-10-CM

## 2024-07-29 DIAGNOSIS — R61 DIAPHORESIS: ICD-10-CM

## 2024-07-29 PROCEDURE — 1160F RVW MEDS BY RX/DR IN RCRD: CPT | Mod: CPTII,S$GLB,, | Performed by: INTERNAL MEDICINE

## 2024-07-29 PROCEDURE — 71046 X-RAY EXAM CHEST 2 VIEWS: CPT | Mod: 26,,, | Performed by: RADIOLOGY

## 2024-07-29 PROCEDURE — 71046 X-RAY EXAM CHEST 2 VIEWS: CPT | Mod: TC,PO

## 2024-07-29 PROCEDURE — 99396 PREV VISIT EST AGE 40-64: CPT | Mod: S$GLB,,, | Performed by: INTERNAL MEDICINE

## 2024-07-29 PROCEDURE — 3044F HG A1C LEVEL LT 7.0%: CPT | Mod: CPTII,S$GLB,, | Performed by: INTERNAL MEDICINE

## 2024-07-29 PROCEDURE — 99999 PR PBB SHADOW E&M-EST. PATIENT-LVL IV: CPT | Mod: PBBFAC,,, | Performed by: INTERNAL MEDICINE

## 2024-07-29 PROCEDURE — 1159F MED LIST DOCD IN RCRD: CPT | Mod: CPTII,S$GLB,, | Performed by: INTERNAL MEDICINE

## 2024-07-29 PROCEDURE — 3079F DIAST BP 80-89 MM HG: CPT | Mod: CPTII,S$GLB,, | Performed by: INTERNAL MEDICINE

## 2024-07-29 PROCEDURE — 3075F SYST BP GE 130 - 139MM HG: CPT | Mod: CPTII,S$GLB,, | Performed by: INTERNAL MEDICINE

## 2024-07-29 PROCEDURE — 3008F BODY MASS INDEX DOCD: CPT | Mod: CPTII,S$GLB,, | Performed by: INTERNAL MEDICINE

## 2024-07-29 RX ORDER — CIPROFLOXACIN 500 MG/1
500 TABLET ORAL EVERY 12 HOURS
Qty: 20 TABLET | Refills: 0 | Status: SHIPPED | OUTPATIENT
Start: 2024-07-29 | End: 2024-08-08

## 2024-07-29 RX ORDER — CIPROFLOXACIN 500 MG/1
500 TABLET ORAL EVERY 12 HOURS
Qty: 20 TABLET | Refills: 0 | Status: SHIPPED | OUTPATIENT
Start: 2024-07-29 | End: 2024-07-29 | Stop reason: SDUPTHER

## 2024-07-29 NOTE — PROGRESS NOTES
Subjective:       Patient ID: Linda Mobley is a 59 y.o. male.    Chief Complaint: Annual Exam (With labs to revu. ) and profuse sweating (And felt warm but  no temp.  Has cystoscope on 7/25 and/mentions  that could be side affect afterwards.   2 covid test were negative.  )    HPI  The patient presents for annual physical examination follow-up.  The patient is accompanied by his wife today for his visit.  He is status post cystoscopy on 07/25/2024 for evaluation of microhematuria.  His workup was negative showing only evidence of prostate enlargement.  24 hours after the examination the patient developed chills and profuse sweating.  He did not check his temperature.  He did experience loss of appetite and weakness.He did not notify his urologist of his symptoms.  His wife performed home COVID tests on Saturday and Sunday-both tests were negative.  The patient has not experienced any diarrhea or gross hematuria.  No dysuria is noted at this time.  He denies having any cough sinus congestion.    Past medical history positive for hyperlipidemia.  There is no personal history of diabetes mellitus, hypertension,or heart disease.    Immunization record was reviewed.    Screening tests were reviewed.    No interval change in past medical history history history since prior evaluations except for that noted in history of present illness.    Review of Systems   Constitutional:  Positive for chills, diaphoresis and fatigue. Negative for activity change, appetite change and unexpected weight change.   HENT:  Negative for sinus pressure/congestion and sore throat.    Eyes:  Negative for visual disturbance.   Respiratory:  Negative for cough, chest tightness, shortness of breath and wheezing.    Cardiovascular:  Negative for chest pain, palpitations and leg swelling.   Gastrointestinal:  Negative for abdominal pain, blood in stool, nausea and vomiting.   Genitourinary:  Negative for dysuria, hematuria and urgency.    Musculoskeletal:  Negative for arthralgias, back pain, gait problem, joint swelling, myalgias and neck stiffness.   Integumentary:  Negative for color change and rash.   Neurological:  Negative for dizziness, syncope, weakness, light-headedness, numbness and headaches.   Psychiatric/Behavioral:  Negative for sleep disturbance.             Physical Exam  Vitals and nursing note reviewed.   Constitutional:       General: He is not in acute distress.     Appearance: Normal appearance. He is well-developed.   HENT:      Head: Normocephalic and atraumatic.      Right Ear: External ear normal.      Left Ear: External ear normal.      Mouth/Throat:      Pharynx: Oropharynx is clear. No oropharyngeal exudate.   Eyes:      General: No scleral icterus.     Extraocular Movements: Extraocular movements intact.      Conjunctiva/sclera: Conjunctivae normal.   Neck:      Thyroid: No thyromegaly.      Vascular: No JVD.   Cardiovascular:      Rate and Rhythm: Normal rate and regular rhythm.      Pulses: Normal pulses.      Heart sounds: Normal heart sounds. No murmur heard.     No friction rub. No gallop.   Pulmonary:      Effort: Pulmonary effort is normal. No respiratory distress.      Breath sounds: Normal breath sounds. No wheezing or rales.   Abdominal:      General: Bowel sounds are normal. There is no distension.      Palpations: Abdomen is soft. There is no mass.      Tenderness: There is no abdominal tenderness. There is no guarding.   Musculoskeletal:         General: No tenderness. Normal range of motion.      Cervical back: Normal range of motion and neck supple.      Right lower leg: No edema.      Left lower leg: No edema.   Lymphadenopathy:      Cervical: No cervical adenopathy.   Skin:     General: Skin is warm and dry.      Findings: No rash.   Neurological:      General: No focal deficit present.      Mental Status: He is alert and oriented to person, place, and time.      Cranial Nerves: No cranial nerve  deficit.      Motor: No abnormal muscle tone.      Deep Tendon Reflexes: Reflexes are normal and symmetric.   Psychiatric:         Behavior: Behavior normal.         Thought Content: Thought content normal.           Lab Visit on 07/29/2024   Component Date Value Ref Range Status    Specimen UA 07/29/2024 Urine, Clean Catch   Final    Color, UA 07/29/2024 Yellow  Yellow, Straw, Jennifer Final    Appearance, UA 07/29/2024 Hazy (A)  Clear Final    pH, UA 07/29/2024 6.0  5.0 - 8.0 Final    Specific Gravity, UA 07/29/2024 >1.030 (A)  1.005 - 1.030 Final    Protein, UA 07/29/2024 2+ (A)  Negative Final    Comment: Recommend a 24 hour urine protein or a urine   protein/creatinine ratio if globulin induced proteinuria is  clinically suspected.      Glucose, UA 07/29/2024 Negative  Negative Final    Ketones, UA 07/29/2024 Trace (A)  Negative Final    Bilirubin (UA) 07/29/2024 Negative  Negative Final    Occult Blood UA 07/29/2024 3+ (A)  Negative Final    Nitrite, UA 07/29/2024 Negative  Negative Final    Leukocytes, UA 07/29/2024 1+ (A)  Negative Final    RBC, UA 07/29/2024 32 (H)  0 - 4 /hpf Final    WBC, UA 07/29/2024 57 (H)  0 - 5 /hpf Final    Bacteria 07/29/2024 Rare  None-Occ /hpf Final    Yeast, UA 07/29/2024 Occasional (A)  None Final    Squam Epithel, UA 07/29/2024 1  /hpf Final    Hyaline Casts, UA 07/29/2024 27 (A)  0-1/lpf /lpf Final    Microscopic Comment 07/29/2024 SEE COMMENT   Final    Comment: Other formed elements not mentioned in the report are not   present in the microscopic examination.      Lab Visit on 07/29/2024   Component Date Value Ref Range Status    WBC 07/29/2024 7.43  3.90 - 12.70 K/uL Final    RBC 07/29/2024 4.79  4.60 - 6.20 M/uL Final    Hemoglobin 07/29/2024 14.9  14.0 - 18.0 g/dL Final    Hematocrit 07/29/2024 44.6  40.0 - 54.0 % Final    MCV 07/29/2024 93  82 - 98 fL Final    MCH 07/29/2024 31.1 (H)  27.0 - 31.0 pg Final    MCHC 07/29/2024 33.4  32.0 - 36.0 g/dL Final    RDW 07/29/2024  13.7  11.5 - 14.5 % Final    Platelets 07/29/2024 191  150 - 450 K/uL Final    MPV 07/29/2024 9.8  9.2 - 12.9 fL Final    Immature Granulocytes 07/29/2024 0.3  0.0 - 0.5 % Final    Gran # (ANC) 07/29/2024 4.7  1.8 - 7.7 K/uL Final    Immature Grans (Abs) 07/29/2024 0.02  0.00 - 0.04 K/uL Final    Comment: Mild elevation in immature granulocytes is non specific and   can be seen in a variety of conditions including stress response,   acute inflammation, trauma and pregnancy. Correlation with other   laboratory and clinical findings is essential.      Lymph # 07/29/2024 1.2  1.0 - 4.8 K/uL Final    Mono # 07/29/2024 1.4 (H)  0.3 - 1.0 K/uL Final    Eos # 07/29/2024 0.1  0.0 - 0.5 K/uL Final    Baso # 07/29/2024 0.03  0.00 - 0.20 K/uL Final    nRBC 07/29/2024 0  0 /100 WBC Final    Gran % 07/29/2024 63.1  38.0 - 73.0 % Final    Lymph % 07/29/2024 16.2 (L)  18.0 - 48.0 % Final    Mono % 07/29/2024 18.8 (H)  4.0 - 15.0 % Final    Eosinophil % 07/29/2024 1.2  0.0 - 8.0 % Final    Basophil % 07/29/2024 0.4  0.0 - 1.9 % Final    Differential Method 07/29/2024 Automated   Final    Sodium 07/29/2024 137  136 - 145 mmol/L Final    Potassium 07/29/2024 4.0  3.5 - 5.1 mmol/L Final    Chloride 07/29/2024 100  95 - 110 mmol/L Final    CO2 07/29/2024 27  23 - 29 mmol/L Final    Glucose 07/29/2024 93  70 - 110 mg/dL Final    BUN 07/29/2024 13  8 - 23 mg/dL Final    Creatinine 07/29/2024 1.3  0.5 - 1.4 mg/dL Final    Calcium 07/29/2024 10.3  8.7 - 10.5 mg/dL Final    Total Protein 07/29/2024 8.6 (H)  6.0 - 8.4 g/dL Final    Albumin 07/29/2024 3.9  3.5 - 5.2 g/dL Final    Total Bilirubin 07/29/2024 0.5  0.1 - 1.0 mg/dL Final    Comment: For infants and newborns, interpretation of results should be based  on gestational age, weight and in agreement with clinical  observations.    Premature Infant recommended reference ranges:  Up to 24 hours.............<8.0 mg/dL  Up to 48 hours............<12.0 mg/dL  3-5 days..................<15.0  mg/dL  6-29 days.................<15.0 mg/dL      Alkaline Phosphatase 07/29/2024 58  55 - 135 U/L Final    AST 07/29/2024 23  10 - 40 U/L Final    ALT 07/29/2024 24  10 - 44 U/L Final    eGFR 07/29/2024 >60.0  >60 mL/min/1.73 m^2 Final    Anion Gap 07/29/2024 10  8 - 16 mmol/L Final    Sed Rate 07/29/2024 21  0 - 23 mm/Hr Final   Lab Visit on 07/15/2024   Component Date Value Ref Range Status    Specimen UA 07/15/2024 Urine, Clean Catch   Final    Color, UA 07/15/2024 Yellow  Yellow, Straw, Jennifer Final    Appearance, UA 07/15/2024 Clear  Clear Final    pH, UA 07/15/2024 6.0  5.0 - 8.0 Final    Specific Gravity, UA 07/15/2024 1.025  1.005 - 1.030 Final    Protein, UA 07/15/2024 1+ (A)  Negative Final    Comment: Recommend a 24 hour urine protein or a urine   protein/creatinine ratio if globulin induced proteinuria is  clinically suspected.      Glucose, UA 07/15/2024 Negative  Negative Final    Ketones, UA 07/15/2024 Negative  Negative Final    Bilirubin (UA) 07/15/2024 Negative  Negative Final    Occult Blood UA 07/15/2024 1+ (A)  Negative Final    Nitrite, UA 07/15/2024 Negative  Negative Final    Urobilinogen, UA 07/15/2024 Negative  <2.0 EU/dL Final    Leukocytes, UA 07/15/2024 Negative  Negative Final    RBC, UA 07/15/2024 5 (H)  0 - 4 /hpf Final    WBC, UA 07/15/2024 3  0 - 5 /hpf Final    Bacteria 07/15/2024 Rare  None-Occ /hpf Final    Squam Epithel, UA 07/15/2024 10  /hpf Final    Hyaline Casts, UA 07/15/2024 2 (A)  0-1/lpf /lpf Final    Microscopic Comment 07/15/2024 SEE COMMENT   Final    Comment: Other formed elements not mentioned in the report are not   present in the microscopic examination.      Lab Visit on 07/15/2024   Component Date Value Ref Range Status    Prostate Specific Antigen (phi) 07/15/2024 2.8  <=4.5 ng/mL Final    Comment: Test Performed by:  [facility]  [address] [place] [address]  : Guru Brown Ph.D.; CLIA#  [id number]      Free PSA (phi) 07/15/2024 0.5  ng/mL Final    [-2] ProPSA (phi) 07/15/2024 6.6  pg/mL Final    % FreePSA (phi) 07/15/2024 SEE BELOW  % Final    Comment: Calculation not performed. Percent Free PSA is only  calculated when Total PSA is 4 - 10 ng/mL.      Prostate Health Index Value (phi) 07/15/2024 SEE BELOW   Final    Comment: Calculation not performed. Prostate Health Index is only  calculated when Total PSA is 4 - 10 ng/mL.    -------------------ADDITIONAL INFORMATION-------------------  The testing method is an immunoenzymatic assay   manufactured by [employer] and performed   on the "[employer]I 800.    Values obtained with different assay methods or   kits may be different and cannot be used   interchangeably.                        Test results cannot be interpreted as absolute   evidence for the presence or absence of   malignant disease.    Test Performed by:  [facility]  [address] [place] [address]  : Guru Brown Ph.D.; CLIA# [id number]     Lab Visit on 06/29/2024   Component Date Value Ref Range Status    Sodium 06/29/2024 142  136 - 145 mmol/L Final    Potassium 06/29/2024 4.6  3.5 - 5.1 mmol/L Final    Chloride 06/29/2024 106  95 - 110 mmol/L Final    CO2 06/29/2024 23  23 - 29 mmol/L Final    Glucose 06/29/2024 82  70 - 110 mg/dL Final    BUN 06/29/2024 12  8 - 23 mg/dL Final    Creatinine 06/29/2024 1.1  0.5 - 1.4 mg/dL Final    Calcium 06/29/2024 9.6  8.7 - 10.5 mg/dL Final    Total Protein 06/29/2024 7.5  6.0 - 8.4 g/dL Final    Albumin 06/29/2024 4.1  3.5 - 5.2 g/dL Final    Total Bilirubin 06/29/2024 0.5  0.1 - 1.0 mg/dL Final    Comment: For infants and newborns, interpretation of results should be based  on gestational age, weight and in agreement with clinical  observations.    Premature Infant recommended reference ranges:  Up to 24 hours.............<8.0 mg/dL  Up to 48 hours............<12.0  mg/dL  3-5 days..................<15.0 mg/dL  6-29 days.................<15.0 mg/dL      Alkaline Phosphatase 06/29/2024 47 (L)  55 - 135 U/L Final    AST 06/29/2024 19  10 - 40 U/L Final    ALT 06/29/2024 21  10 - 44 U/L Final    eGFR 06/29/2024 >60.0  >60 mL/min/1.73 m^2 Final    Anion Gap 06/29/2024 13  8 - 16 mmol/L Final    Cholesterol 06/29/2024 230 (H)  120 - 199 mg/dL Final    Comment: The National Cholesterol Education Program (NCEP) has set the  following guidelines (reference ranges) for Cholesterol:  Optimal.....................<200 mg/dL  Borderline High.............200-239 mg/dL  High........................> or = 240 mg/dL      Triglycerides 06/29/2024 102  30 - 150 mg/dL Final    Comment: The National Cholesterol Education Program (NCEP) has set the  following guidelines (reference values) for triglycerides:  Normal......................<150 mg/dL  Borderline High.............150-199 mg/dL  High........................200-499 mg/dL      HDL 06/29/2024 57  40 - 75 mg/dL Final    Comment: The National Cholesterol Education Program (NCEP) has set the  following guidelines (reference values) for HDL Cholesterol:  Low...............<40 mg/dL  Optimal...........>60 mg/dL      LDL Cholesterol 06/29/2024 152.6  63.0 - 159.0 mg/dL Final    Comment: The National Cholesterol Education Program (NCEP) has set the  following guidelines (reference values) for LDL Cholesterol:  Optimal.......................<130 mg/dL  Borderline High...............130-159 mg/dL  High..........................160-189 mg/dL  Very High.....................>190 mg/dL      HDL/Cholesterol Ratio 06/29/2024 24.8  20.0 - 50.0 % Final    Total Cholesterol/HDL Ratio 06/29/2024 4.0  2.0 - 5.0 Final    Non-HDL Cholesterol 06/29/2024 173  mg/dL Final    Comment: Risk category and Non-HDL cholesterol goals:  Coronary heart disease (CHD)or equivalent (10-year risk of CHD >20%):  Non-HDL cholesterol goal     <130 mg/dL  Two or more CHD risk  factors and 10-year risk of CHD <= 20%:  Non-HDL cholesterol goal     <160 mg/dL  0 to 1 CHD risk factor:  Non-HDL cholesterol goal     <190 mg/dL      WBC 06/29/2024 4.93  3.90 - 12.70 K/uL Final    RBC 06/29/2024 4.61  4.60 - 6.20 M/uL Final    Hemoglobin 06/29/2024 14.4  14.0 - 18.0 g/dL Final    Hematocrit 06/29/2024 42.7  40.0 - 54.0 % Final    MCV 06/29/2024 93  82 - 98 fL Final    MCH 06/29/2024 31.2 (H)  27.0 - 31.0 pg Final    MCHC 06/29/2024 33.7  32.0 - 36.0 g/dL Final    RDW 06/29/2024 13.4  11.5 - 14.5 % Final    Platelets 06/29/2024 211  150 - 450 K/uL Final    MPV 06/29/2024 9.6  9.2 - 12.9 fL Final    Immature Granulocytes 06/29/2024 0.0  0.0 - 0.5 % Final    Gran # (ANC) 06/29/2024 2.7  1.8 - 7.7 K/uL Final    Immature Grans (Abs) 06/29/2024 0.00  0.00 - 0.04 K/uL Final    Comment: Mild elevation in immature granulocytes is non specific and   can be seen in a variety of conditions including stress response,   acute inflammation, trauma and pregnancy. Correlation with other   laboratory and clinical findings is essential.      Lymph # 06/29/2024 1.6  1.0 - 4.8 K/uL Final    Mono # 06/29/2024 0.5  0.3 - 1.0 K/uL Final    Eos # 06/29/2024 0.1  0.0 - 0.5 K/uL Final    Baso # 06/29/2024 0.04  0.00 - 0.20 K/uL Final    nRBC 06/29/2024 0  0 /100 WBC Final    Gran % 06/29/2024 55.4  38.0 - 73.0 % Final    Lymph % 06/29/2024 32.9  18.0 - 48.0 % Final    Mono % 06/29/2024 9.1  4.0 - 15.0 % Final    Eosinophil % 06/29/2024 1.8  0.0 - 8.0 % Final    Basophil % 06/29/2024 0.8  0.0 - 1.9 % Final    Differential Method 06/29/2024 Automated   Final    TSH 06/29/2024 2.267  0.400 - 4.000 uIU/mL Final    PSA, Screen 06/29/2024 5.1 (H)  0.00 - 4.00 ng/mL Final    Comment: The testing method is a chemiluminescent microparticle immunoassay   manufactured by [employer] and performed on the    or   [employer] system. Values obtained with different assay manufacturers   for   methods may be different and  "cannot be used interchangeably.  PSA Expected levels:  Hormonal Therapy: <0.05 ng/ml  Prostatectomy: <0.01 ng/ml  Radiation Therapy: <1.00 ng/ml      Hemoglobin A1C 06/29/2024 5.6  4.0 - 5.6 % Final    Comment: ADA Screening Guidelines:  5.7-6.4%  Consistent with prediabetes  >or=6.5%  Consistent with diabetes    High levels of fetal hemoglobin interfere with the HbA1C  assay. Heterozygous hemoglobin variants (HbS, HgC, etc)do  not significantly interfere with this assay.   However, presence of multiple variants may affect accuracy.      Estimated Avg Glucose 06/29/2024 114  68 - 131 mg/dL Final   Lab Visit on 06/29/2024   Component Date Value Ref Range Status    Specimen UA 06/29/2024 Urine, Clean Catch   Final    Color, UA 06/29/2024 Yellow  Yellow, Straw, Jennifer Final    Appearance, UA 06/29/2024 Clear  Clear Final    pH, UA 06/29/2024 6.0  5.0 - 8.0 Final    Specific Gravity, UA 06/29/2024 1.025  1.005 - 1.030 Final    Protein, UA 06/29/2024 Trace (A)  Negative Final    Comment: Recommend a 24 hour urine protein or a urine   protein/creatinine ratio if globulin induced proteinuria is  clinically suspected.      Glucose, UA 06/29/2024 Negative  Negative Final    Ketones, UA 06/29/2024 Negative  Negative Final    Bilirubin (UA) 06/29/2024 Negative  Negative Final    Occult Blood UA 06/29/2024 Trace (A)  Negative Final    Nitrite, UA 06/29/2024 Negative  Negative Final    Leukocytes, UA 06/29/2024 Negative  Negative Final       Assessment & Plan:      Linda Asencio" was seen today for annual exam and profuse sweating.  The patient will be treated for urinary tract infection.  Urine studies will be obtained today.  Prescription for Cipro will be sent to his pharmacy.    Chest x-ray and additional labs will be obtained today.    The patient has been encouraged to decrease fat and sugar intake in the diet.  Lipid studies will be repeated in 6 months.    Diagnoses and all orders for this visit:    Well adult " exam    Other hyperlipidemia  -     Lipid Panel; Future  -     Comprehensive Metabolic Panel; Future    Diaphoresis  -     Urine culture; Future  -     Urinalysis; Future  -     CBC Auto Differential; Future  -     Comprehensive Metabolic Panel; Future  -     Sedimentation rate; Future  -     X-Ray Chest PA And Lateral; Future  -     Lipid Panel; Future  -     Comprehensive Metabolic Panel; Future    Chills  -     Urine culture; Future  -     Urinalysis; Future  -     CBC Auto Differential; Future  -     Comprehensive Metabolic Panel; Future  -     Sedimentation rate; Future  -     X-Ray Chest PA And Lateral; Future    Other orders  -     Discontinue: ciprofloxacin HCl (CIPRO) 500 MG tablet; Take 1 tablet (500 mg total) by mouth every 12 (twelve) hours. for 10 days  -     ciprofloxacin HCl (CIPRO) 500 MG tablet; Take 1 tablet (500 mg total) by mouth every 12 (twelve) hours. for 10 days         No follow-ups on file.     lFavio Colunga MD

## 2024-07-30 ENCOUNTER — TELEPHONE (OUTPATIENT)
Dept: INTERNAL MEDICINE | Facility: CLINIC | Age: 59
End: 2024-07-30
Payer: COMMERCIAL

## 2024-07-30 NOTE — TELEPHONE ENCOUNTER
----- Message from Flavio Colunga MD sent at 7/29/2024  6:38 PM CDT -----  Chest x-ray was clear.  No evidence of pneumonia.

## 2025-05-02 ENCOUNTER — HOSPITAL ENCOUNTER (OUTPATIENT)
Dept: RADIOLOGY | Facility: HOSPITAL | Age: 60
Discharge: HOME | End: 2025-05-02
Attending: STUDENT IN AN ORGANIZED HEALTH CARE EDUCATION/TRAINING PROGRAM
Payer: COMMERCIAL

## 2025-05-02 DIAGNOSIS — R97.20 ELEVATED PSA: ICD-10-CM

## 2025-05-02 PROCEDURE — A9585 GADOBUTROL INJECTION: HCPCS | Performed by: STUDENT IN AN ORGANIZED HEALTH CARE EDUCATION/TRAINING PROGRAM

## 2025-05-02 PROCEDURE — 72197 MRI PELVIS W/O & W/DYE: CPT | Mod: TC

## 2025-05-02 PROCEDURE — 25500020 PHARM REV CODE 255: Performed by: STUDENT IN AN ORGANIZED HEALTH CARE EDUCATION/TRAINING PROGRAM

## 2025-05-02 PROCEDURE — 72197 MRI PELVIS W/O & W/DYE: CPT | Mod: 26,,, | Performed by: RADIOLOGY

## 2025-05-02 RX ORDER — GADOBUTROL 604.72 MG/ML
10 INJECTION INTRAVENOUS
Status: COMPLETED | OUTPATIENT
Start: 2025-05-02 | End: 2025-05-02

## 2025-05-02 RX ADMIN — GADOBUTROL 10 ML: 604.72 INJECTION INTRAVENOUS at 07:05

## 2025-05-15 ENCOUNTER — OFFICE VISIT (OUTPATIENT)
Dept: UROLOGY | Facility: CLINIC | Age: 60
End: 2025-05-15
Payer: COMMERCIAL

## 2025-05-15 DIAGNOSIS — R97.20 ELEVATED PSA: Primary | ICD-10-CM

## 2025-05-15 PROCEDURE — 98006 SYNCH AUDIO-VIDEO EST MOD 30: CPT | Mod: 95,,, | Performed by: STUDENT IN AN ORGANIZED HEALTH CARE EDUCATION/TRAINING PROGRAM

## 2025-05-15 NOTE — PROGRESS NOTES
[facility]  Urologic Oncology Clinic Note  Telehealth visit  Visit type: audio + visual   Patient location : Home      Patient was identified by name and birth date. The patient agreed to proceed with a telehealth visit. The visit was performed by audio and video communication.       Date of Service: 2025    Chief Complaint/Reason for Consultation: Elevated PSA    History of Present Illness:   Patient 60 y.o. male presents with elevated PSA of 5.1. PSA usually near 2.8, but did have one value 2 years ago of 4.9     + family history of prostate cancer - grandfather  from prostate cancer. Couple uncles with prostate cancer, and younger cousin with prostate cancer.     Blood thinners: 81mg aspirin daily, 325mg aspirin daily, Lovenox, Eliquis, Xarelto, Clopidogrel/Plavix, Coumadin, Ticagrelor/Brilinta, and     No Hx of TIA, MI, and CVA   Abdominal surgeries: Hernia repair Lap hiatal hernia early .     Has not obtained repeat PSA test.    Urologic History:     2022 -- PSA 4.9   2023 -- PSA 2.8   2024 -- PSA 5.1   2024 -- Negative cystoscopy for GH workup. Trilobar hyperplasia.      Patient Active Problem List    Diagnosis Date Noted    Obesity 2019    Vitamin D deficiency     Hyperlipidemia     Chronic rhinitis         Review of patient's allergies indicates:   Allergen Reactions    Pcn [penicillins] Hives        Past Medical History:   Diagnosis Date    Chronic rhinitis     Hyperlipidemia     Obesity     Unspecified vitamin D deficiency       Past Surgical History:   Procedure Laterality Date    achilles tendon repair      right    COLONOSCOPY N/A 2018    Procedure: COLONOSCOPY;  Surgeon: Pablo Sanders MD;  Location: [facility] ([address] [place] [address]);  Service: Endoscopy;  Laterality: N/A;    COLONOSCOPY N/A 2023    Procedure: COLONOSCOPY;  Surgeon: Luis Felipe Juan MD;  Location: [facility] ([address] [place] [address]);  Service: Endoscopy;  Laterality: N/A;  peg prep-portal-clyde green-tb     epigastric hernia repair  2010    Incarcerated epigastric hernia.         Family History   Problem Relation Name Age of Onset    Prostate cancer Father      Hypertension Father      Cancer Father  55        prostate    Breast cancer Mother      Diabetes Paternal Uncle      Cancer Paternal Grandfather          prostate      Social History     Tobacco Use    Smoking status: Some Days     Types: Cigars    Smokeless tobacco: Never    Tobacco comments:     occasional cigar use   Substance Use Topics    Alcohol use: No     Alcohol/week: 0.0 standard drinks of alcohol        Review of Systems   Constitutional:  Negative for activity change, fever and unexpected weight change.   HENT:  Negative for congestion.    Respiratory:  Negative for cough and shortness of breath.    Cardiovascular:  Negative for chest pain.   Gastrointestinal:  Negative for abdominal pain.   Genitourinary:  Negative for hematuria.             OBJECTIVE:     No exam as this was virtual visit.            LAB:      Lab Results   Component Value Date    PSA 5.1 (H) 06/29/2024    PSA 2.4 06/13/2023    PSA 4.9 (H) 06/09/2022           IMAGING:        No relevant imaging to review        ASSESSMENT/PLAN:     59 yo M with elevated PSA    Plan:    Elevated PSA  Today I informed the patient of incidence, risk factors, screening guidelines, and natural history of prostate cancer. Furthermore, we discussed the inaccuracies of PSA testing, rise due to size of the prostate gland or cancer, and the role of additional evaluation with various blood tests or prostate MRI that can be more informative in distinguishing elevated PSA attributed to benign prostate hypertrophy vs cancer.      I told him that he is an high an approximately 40% incidence of any prostate cancer at biopsy and an approximately 18% of clinically significant prostate cancer.      To this end we have decided to order the patient a PHI (in one week ) and mpMRI of his prostate if PHI concerning and  PSA still elevated.     We discussed that the mpMRI can survey the whole gland to search for abnormalities suggestive of prostate cancer and in some select cases can be used to avoid biopsy and in other cases can enhance the traditional biopsies methods by helping us perform a targeted biopsy.     In brief, I told him about how a prostate biopsy is performed per rectum with ultrasound guidance. I informed him of the risks of the procedure such as infection , blood per urine, blood per rectum , acute urinary retention , and acute loss of erectile function.    We did discuss possibly avoiding prostate biopsy if PSA returned back to baseline of 2.8        Abnormal UA  -Trace blood noted on last UA  -Will obtain urinanalysis and urine microscopy  -Possible work up for micro hematuria in the future      Anthony Fitzgerald MD  Urologic Oncology  P: [phone]

## 2025-05-27 ENCOUNTER — OFFICE VISIT (OUTPATIENT)
Dept: UROLOGY | Facility: CLINIC | Age: 60
End: 2025-05-27
Payer: COMMERCIAL

## 2025-05-27 DIAGNOSIS — R97.20 ELEVATED PSA: Primary | ICD-10-CM

## 2025-07-29 ENCOUNTER — TELEPHONE (OUTPATIENT)
Dept: INTERNAL MEDICINE | Facility: CLINIC | Age: 60
End: 2025-07-29
Payer: COMMERCIAL

## 2025-07-29 DIAGNOSIS — Z00.00 WELL ADULT EXAM: ICD-10-CM

## 2025-07-29 DIAGNOSIS — R97.20 ELEVATED PSA: ICD-10-CM

## 2025-07-29 NOTE — TELEPHONE ENCOUNTER
Entered annual labs for appt Thursday.    Left msg to schedule for Wednesday w/  Phone staff or come fasting Thursday to  Do after see .     prefers ahead of time so has results to go  Over at appt.

## 2025-07-30 ENCOUNTER — LAB VISIT (OUTPATIENT)
Dept: LAB | Facility: HOSPITAL | Age: 60
End: 2025-07-30
Attending: INTERNAL MEDICINE
Payer: COMMERCIAL

## 2025-07-30 DIAGNOSIS — Z00.00 WELL ADULT EXAM: ICD-10-CM

## 2025-07-30 LAB
ABSOLUTE EOSINOPHIL (OHS): 0.11 K/UL
ABSOLUTE MONOCYTE (OHS): 0.36 K/UL (ref 0.3–1)
ABSOLUTE NEUTROPHIL COUNT (OHS): 2.24 K/UL (ref 1.8–7.7)
ALBUMIN SERPL BCP-MCNC: 4.4 G/DL (ref 3.5–5.2)
ALP SERPL-CCNC: 54 UNIT/L (ref 40–150)
ALT SERPL W/O P-5'-P-CCNC: 26 UNIT/L (ref 0–55)
ANION GAP (OHS): 9 MMOL/L (ref 8–16)
AST SERPL-CCNC: 26 UNIT/L (ref 0–50)
BASOPHILS # BLD AUTO: 0.02 K/UL
BASOPHILS NFR BLD AUTO: 0.4 %
BILIRUB SERPL-MCNC: 0.5 MG/DL (ref 0.1–1)
BILIRUB UR QL STRIP.AUTO: NEGATIVE
BUN SERPL-MCNC: 12 MG/DL (ref 8–23)
CALCIUM SERPL-MCNC: 9.3 MG/DL (ref 8.7–10.5)
CHLORIDE SERPL-SCNC: 105 MMOL/L (ref 95–110)
CHOLEST SERPL-MCNC: 221 MG/DL (ref 120–199)
CHOLEST/HDLC SERPL: 4 {RATIO} (ref 2–5)
CLARITY UR: CLEAR
CO2 SERPL-SCNC: 25 MMOL/L (ref 23–29)
COLOR UR AUTO: YELLOW
CREAT SERPL-MCNC: 1.1 MG/DL (ref 0.5–1.4)
EAG (OHS): 123 MG/DL (ref 68–131)
ERYTHROCYTE [DISTWIDTH] IN BLOOD BY AUTOMATED COUNT: 13.3 % (ref 11.5–14.5)
GFR SERPLBLD CREATININE-BSD FMLA CKD-EPI: >60 ML/MIN/1.73/M2
GLUCOSE SERPL-MCNC: 92 MG/DL (ref 70–110)
GLUCOSE UR QL STRIP: NEGATIVE
HBA1C MFR BLD: 5.9 % (ref 4–5.6)
HCT VFR BLD AUTO: 43.5 % (ref 40–54)
HDLC SERPL-MCNC: 55 MG/DL (ref 40–75)
HDLC SERPL: 24.9 % (ref 20–50)
HGB BLD-MCNC: 14.6 GM/DL (ref 14–18)
HGB UR QL STRIP: ABNORMAL
IMM GRANULOCYTES # BLD AUTO: 0.01 K/UL (ref 0–0.04)
IMM GRANULOCYTES NFR BLD AUTO: 0.2 % (ref 0–0.5)
KETONES UR QL STRIP: NEGATIVE
LDLC SERPL CALC-MCNC: 146.6 MG/DL (ref 63–159)
LEUKOCYTE ESTERASE UR QL STRIP: NEGATIVE
LYMPHOCYTES # BLD AUTO: 1.79 K/UL (ref 1–4.8)
MCH RBC QN AUTO: 30.4 PG (ref 27–31)
MCHC RBC AUTO-ENTMCNC: 33.6 G/DL (ref 32–36)
MCV RBC AUTO: 91 FL (ref 82–98)
NITRITE UR QL STRIP: NEGATIVE
NONHDLC SERPL-MCNC: 166 MG/DL
NUCLEATED RBC (/100WBC) (OHS): 0 /100 WBC
PH UR STRIP: 5 [PH]
PLATELET # BLD AUTO: 200 K/UL (ref 150–450)
PMV BLD AUTO: 9.1 FL (ref 9.2–12.9)
POTASSIUM SERPL-SCNC: 4.5 MMOL/L (ref 3.5–5.1)
PROT SERPL-MCNC: 7.8 GM/DL (ref 6–8.4)
PROT UR QL STRIP: NEGATIVE
PSA SERPL-MCNC: 3.28 NG/ML
RBC # BLD AUTO: 4.8 M/UL (ref 4.6–6.2)
RELATIVE EOSINOPHIL (OHS): 2.4 %
RELATIVE LYMPHOCYTE (OHS): 39.5 % (ref 18–48)
RELATIVE MONOCYTE (OHS): 7.9 % (ref 4–15)
RELATIVE NEUTROPHIL (OHS): 49.6 % (ref 38–73)
SODIUM SERPL-SCNC: 139 MMOL/L (ref 136–145)
SP GR UR STRIP: 1.02
TRIGL SERPL-MCNC: 97 MG/DL (ref 30–150)
TSH SERPL-ACNC: 2.93 UIU/ML (ref 0.4–4)
UROBILINOGEN UR STRIP-ACNC: NEGATIVE EU/DL
WBC # BLD AUTO: 4.53 K/UL (ref 3.9–12.7)

## 2025-07-30 PROCEDURE — 84153 ASSAY OF PSA TOTAL: CPT

## 2025-07-30 PROCEDURE — 82040 ASSAY OF SERUM ALBUMIN: CPT

## 2025-07-30 PROCEDURE — 84478 ASSAY OF TRIGLYCERIDES: CPT

## 2025-07-30 PROCEDURE — 85025 COMPLETE CBC W/AUTO DIFF WBC: CPT

## 2025-07-30 PROCEDURE — 84443 ASSAY THYROID STIM HORMONE: CPT

## 2025-07-30 PROCEDURE — 83036 HEMOGLOBIN GLYCOSYLATED A1C: CPT

## 2025-07-30 PROCEDURE — 36415 COLL VENOUS BLD VENIPUNCTURE: CPT | Mod: PO

## 2025-07-30 PROCEDURE — 81003 URINALYSIS AUTO W/O SCOPE: CPT

## 2025-07-31 ENCOUNTER — OFFICE VISIT (OUTPATIENT)
Dept: INTERNAL MEDICINE | Facility: CLINIC | Age: 60
End: 2025-07-31
Payer: COMMERCIAL

## 2025-07-31 VITALS
RESPIRATION RATE: 18 BRPM | HEART RATE: 79 BPM | HEIGHT: 70 IN | BODY MASS INDEX: 35.65 KG/M2 | DIASTOLIC BLOOD PRESSURE: 86 MMHG | OXYGEN SATURATION: 97 % | WEIGHT: 249 LBS | SYSTOLIC BLOOD PRESSURE: 136 MMHG | TEMPERATURE: 98 F

## 2025-07-31 DIAGNOSIS — R73.03 PREDIABETES: ICD-10-CM

## 2025-07-31 DIAGNOSIS — E66.9 OBESITY, UNSPECIFIED CLASS, UNSPECIFIED OBESITY TYPE, UNSPECIFIED WHETHER SERIOUS COMORBIDITY PRESENT: ICD-10-CM

## 2025-07-31 DIAGNOSIS — Z00.00 WELL ADULT EXAM: Primary | ICD-10-CM

## 2025-07-31 DIAGNOSIS — E78.49 OTHER HYPERLIPIDEMIA: ICD-10-CM

## 2025-07-31 DIAGNOSIS — Z86.0100 HX OF COLONIC POLYPS: ICD-10-CM

## 2025-07-31 DIAGNOSIS — Z12.11 COLON CANCER SCREENING: ICD-10-CM

## 2025-07-31 LAB — HOLD SPECIMEN: NORMAL

## 2025-07-31 PROCEDURE — 99999 PR PBB SHADOW E&M-EST. PATIENT-LVL IV: CPT | Mod: PBBFAC,,, | Performed by: INTERNAL MEDICINE

## 2025-07-31 NOTE — PROGRESS NOTES
Subjective:       Patient ID: Linda Mobley is a 60 y.o. male.    Chief Complaint: Annual Exam    History of Present Illness    Linda presents today for annual examination and follow-up of active medical conditions which include hyperlipidemia, obesity.  The patient has a personal history of colonic polyps.  He is due for a 2 year follow-up screening colonoscopy as recommended by GI.    Screening tests were reviewed.    Immunization record was reviewed.  The patient is a candidate for Prevnar-20 vaccine.  He was also encouraged to obtain the adult tetanus booster (adult Tdap).    WEIGHT MANAGEMENT:  He reports significant weight gain from 231 to 249 lbs, attributed to recent job change from  to a  role. He describes a more sedentary lifestyle with increased sitting during work hours and reduced physical activity compared to his previous occupation. To counteract prolonged sitting, he has initiated a home exercise routine of walking 10-20 minutes, 2-3 times per day.    LABS / TEST RESULTS:  Hemoglobin A1C elevated at 5.9%, indicating pre-diabetic range. Total cholesterol increased from 195 to 221, with LDL cholesterol rising from 120 to 146.6. PSA level decreased from 4.97 to 3.28. Thyroid, kidney function, liver function tests, and complete blood count were normal.    SLEEP:  He reports irregular sleep patterns due to excessive television watching. He describes a tendency to engage in prolonged binge-watching, often staying awake until 2:00 AM. He is currently attempting to modify his sleep routine by trying to go to bed earlier.    MEDICAL HISTORY:  Recent colonoscopy performed in September 2023 with multiple polyps removed, recommended follow-up colonoscopy in two years for surveillance. History of prostate issues requiring cystoscopy with urologist to evaluate bladder. Recent back pain reported approximately two weeks ago, described as intermittent and recurring,  currently stable.    FAMILY HISTORY:  Mother has high blood pressure. Father did not have high blood pressure.      ROS:  Constitutional: +sleep disturbances  Head: -headaches  Eyes: +vision changes  ENT: -rhinorrhea, -sinus pressure, -sore throat  Cardiovascular: -chest pain, -lower extremity edema  Musculoskeletal: +back pain              Physical Exam  Vitals and nursing note reviewed.   Constitutional:       General: He is not in acute distress.     Appearance: Normal appearance. He is well-developed.      Comments: The patient has gained 18 lb since 07/29/2024.   HENT:      Head: Normocephalic and atraumatic.      Right Ear: Tympanic membrane, ear canal and external ear normal.      Left Ear: Tympanic membrane, ear canal and external ear normal.      Mouth/Throat:      Mouth: Mucous membranes are moist.      Pharynx: Oropharynx is clear. No oropharyngeal exudate.   Eyes:      General: No scleral icterus.     Extraocular Movements: Extraocular movements intact.      Conjunctiva/sclera: Conjunctivae normal.   Neck:      Thyroid: No thyromegaly.      Vascular: No JVD.   Cardiovascular:      Rate and Rhythm: Normal rate and regular rhythm.      Pulses: Normal pulses.      Heart sounds: Normal heart sounds. No murmur heard.     No friction rub. No gallop.   Pulmonary:      Effort: Pulmonary effort is normal. No respiratory distress.      Breath sounds: Normal breath sounds. No wheezing or rales.   Abdominal:      General: Bowel sounds are normal. There is no distension.      Palpations: Abdomen is soft. There is no mass.      Tenderness: There is no abdominal tenderness. There is no guarding.   Musculoskeletal:         General: No tenderness. Normal range of motion.      Cervical back: Normal range of motion and neck supple.      Right lower leg: No edema.      Left lower leg: No edema.   Lymphadenopathy:      Cervical: No cervical adenopathy.   Skin:     General: Skin is warm and dry.      Findings: No rash.    Neurological:      General: No focal deficit present.      Mental Status: He is alert and oriented to person, place, and time.      Cranial Nerves: No cranial nerve deficit.      Motor: No abnormal muscle tone.      Deep Tendon Reflexes: Reflexes are normal and symmetric.   Psychiatric:         Behavior: Behavior normal.         Thought Content: Thought content normal.           Lab Visit on 07/30/2025   Component Date Value Ref Range Status    Sodium 07/30/2025 139  136 - 145 mmol/L Final    Potassium 07/30/2025 4.5  3.5 - 5.1 mmol/L Final    Chloride 07/30/2025 105  95 - 110 mmol/L Final    CO2 07/30/2025 25  23 - 29 mmol/L Final    Glucose 07/30/2025 92  70 - 110 mg/dL Final    BUN 07/30/2025 12  8 - 23 mg/dL Final    Creatinine 07/30/2025 1.1  0.5 - 1.4 mg/dL Final    Calcium 07/30/2025 9.3  8.7 - 10.5 mg/dL Final    Protein Total 07/30/2025 7.8  6.0 - 8.4 gm/dL Final    Albumin 07/30/2025 4.4  3.5 - 5.2 g/dL Final    Bilirubin Total 07/30/2025 0.5  0.1 - 1.0 mg/dL Final    For infants and newborns, interpretation of results should be based   on gestational age, weight and in agreement with clinical   observations.    Premature Infant recommended reference ranges:   0-24 hours:  <8.0 mg/dL   24-48 hours: <12.0 mg/dL   3-5 days:    <15.0 mg/dL   6-29 days:   <15.0 mg/dL    ALP 07/30/2025 54  40 - 150 unit/L Final    AST 07/30/2025 26  0 - 50 unit/L Final      An activated reagent was used, which may result in slightly higher values compared to standard method.    ALT 07/30/2025 26  0 - 55 unit/L Final      An activated reagent was used, which may result in slightly higher values compared to standard method.    Anion Gap 07/30/2025 9  8 - 16 mmol/L Final    eGFR 07/30/2025 >60  >60 mL/min/1.73/m2 Final    Estimated GFR calculated using the CKD-EPI creatinine (2021) equation.    Cholesterol Total 07/30/2025 221 (H)  120 - 199 mg/dL Final    The National Cholesterol Education Program (NCEP) has set the  following  guidelines (reference ranges) for Cholesterol:  Optimal.....................<200 mg/dL  Borderline High.............200-239 mg/dL  High........................> or = 240 mg/dL    Triglyceride 07/30/2025 97  30 - 150 mg/dL Final    The National Cholesterol Education Program (NCEP) has set the  following guidelines (reference values) for triglycerides:  Normal......................<150 mg/dL  Borderline High.............150-199 mg/dL  High........................200-499 mg/dL    HDL Cholesterol 07/30/2025 55  40 - 75 mg/dL Final    The National Cholesterol Education Program (NCEP) has set the   following guidelines (reference values) for HDL Cholesterol:   Low...............<40 mg/dL   Optimal...........>60 mg/dL    LDL Cholesterol 07/30/2025 146.6  63.0 - 159.0 mg/dL Final    The National Cholesterol Education Program (NCEP) has set the  following guidelines (reference values) for LDL Cholesterol:  Optimal.......................<130 mg/dL  Borderline High...............130-159 mg/dL  High..........................160-189 mg/dL  Very High.....................>190 mg/dL  LDL calculated using the Friedewald equation.    HDL/Cholesterol Ratio 07/30/2025 24.9  20.0 - 50.0 % Final    Cholesterol/HDL Ratio 07/30/2025 4.0  2.0 - 5.0 Final    Non HDL Cholesterol 07/30/2025 166  mg/dL Final    Risk category and Non-HDL cholesterol goals:  Coronary heart disease (CHD)or equivalent (10-year risk of CHD >20%):  Non-HDL cholesterol goal     <130 mg/dL  Two or more CHD risk factors and 10-year risk of CHD <= 20%:  Non-HDL cholesterol goal     <160 mg/dL  0 to 1 CHD risk factor:  Non-HDL cholesterol goal     <190 mg/dL    TSH 07/30/2025 2.931  0.400 - 4.000 uIU/mL Final    Prostate Specific Antigen 07/30/2025 3.28  <=4.00 ng/mL Final    PSA Expected levels:  Hormonal therapy: < 0.05 ng/mL   Prostatectomy: < 0.01 ng/mL   Radiation therapy: < 1.00 ng/mL      Hemoglobin A1c 07/30/2025 5.9 (H)  4.0 - 5.6 % Final    ADA Screening  Guidelines:  5.7-6.4%  Consistent with prediabetes  >=6.5%  Consistent with diabetes    High levels of fetal hemoglobin interfere with the HbA1C  assay. Heterozygous hemoglobin variants (HbS, HgC, etc)do  not significantly interfere with this assay.   However, presence of multiple variants may affect accuracy.    Estimated Average Glucose 07/30/2025 123  68 - 131 mg/dL Final    WBC 07/30/2025 4.53  3.90 - 12.70 K/uL Final    RBC 07/30/2025 4.80  4.60 - 6.20 M/uL Final    HGB 07/30/2025 14.6  14.0 - 18.0 gm/dL Final    HCT 07/30/2025 43.5  40.0 - 54.0 % Final    MCV 07/30/2025 91  82 - 98 fL Final    MCH 07/30/2025 30.4  27.0 - 31.0 pg Final    MCHC 07/30/2025 33.6  32.0 - 36.0 g/dL Final    RDW 07/30/2025 13.3  11.5 - 14.5 % Final    Platelet Count 07/30/2025 200  150 - 450 K/uL Final    MPV 07/30/2025 9.1 (L)  9.2 - 12.9 fL Final    Nucleated RBC 07/30/2025 0  <=0 /100 WBC Final    Neut % 07/30/2025 49.6  38 - 73 % Final    Lymph % 07/30/2025 39.5  18 - 48 % Final    Mono % 07/30/2025 7.9  4 - 15 % Final    Eos % 07/30/2025 2.4  <=8 % Final    Basophil % 07/30/2025 0.4  <=1.9 % Final    Imm Grans % 07/30/2025 0.2  0.0 - 0.5 % Final    Neut # 07/30/2025 2.24  1.8 - 7.7 K/uL Final    Lymph # 07/30/2025 1.79  1 - 4.8 K/uL Final    Mono # 07/30/2025 0.36  0.3 - 1 K/uL Final    Eos # 07/30/2025 0.11  <=0.5 K/uL Final    Baso # 07/30/2025 0.02  <=0.2 K/uL Final    Imm Grans # 07/30/2025 0.01  0.00 - 0.04 K/uL Final    Mild elevation in immature granulocytes is non specific and can be seen in a variety of conditions including stress response, acute inflammation, trauma and pregnancy. Correlation with other laboratory and clinical findings is essential.    Color, UA 07/30/2025 Yellow  Straw, Jennifer, Yellow, Light-Orange Final    Appearance, UA 07/30/2025 Clear  Clear Final    pH, UA 07/30/2025 5.0  5.0 - 8.0 Final    Spec Grav UA 07/30/2025 1.020  1.005 - 1.030 Final    Protein, UA 07/30/2025 Negative  Negative Final     Recommend a 24 hour urine protein or a urine protein/creatinine ratio if globulin induced proteinuria is clinically suspected.    Glucose, UA 07/30/2025 Negative  Negative Final    Ketones, UA 07/30/2025 Negative  Negative Final    Bilirubin, UA 07/30/2025 Negative  Negative Final    Blood, UA 07/30/2025 Trace (A)  Negative Final    Nitrites, UA 07/30/2025 Negative  Negative Final    Urobilinogen, UA 07/30/2025 Negative  <2.0 EU/dL Final    Leukocyte Esterase, UA 07/30/2025 Negative  Negative Final    Extra Tube 07/30/2025 Hold for add-ons.   Final    Auto resulted.      Lab Visit on 05/23/2025   Component Date Value Ref Range Status    Prostate Specific Antigen 05/23/2025 4.97 (H)  <=4.00 ng/mL Final    PSA Expected levels:  Hormonal therapy: < 0.05 ng/mL   Prostatectomy: < 0.01 ng/mL   Radiation therapy: < 1.00 ng/mL         Assessment & Plan:     Assessment & Plan     Assessed recent lab work: borderline /86, weight gain to 249 lbs.   Hemoglobin A1C 5.9%, indicating prediabetes range.   Reviewed cholesterol levels: total cholesterol 221, .6.   Recent change to more sedentary work contributing to weight gain and metabolic changes.   Determined diet and exercise modifications as primary interventions for prediabetes and cholesterol management.   Noted improvement in PSA from 4.97 to 3.28.   Reviewed normal thyroid, kidney, and liver function tests.   Assessed need for pneumonia vaccine based on updated age guidelines.    ## PREDIABETES:   Monitored hemoglobin A1C level of 5.9%, which falls in the prediabetic range (normal range 4-5.6%, prediabetic range 5.7 to 6.4%).   Assessed the patient's condition as prediabetes with increased risk for developing diabetes.   Explained the significance of the A1C test in screening for diabetes and prediabetes.   Educated the patient on the importance of weight loss and exercise in managing this condition.    ## HYPERLIPIDEMIA:   Monitored cholesterol which increased  from 195 last year to 221, and LDL cholesterol which increased from 120 to 146.6.   These values exceed recommended targets (total cholesterol should be under 200, LDL less than 130, with less than 100 considered optimal).   Discussed relationship between dietary fat intake and LDL cholesterol levels.   Recommend reducing intake of fatty foods and fried foods, especially from red meat.    ## BACK PAIN:   Monitored occasional back pain with a flare-up 2 weeks ago.   Pain is not currently present but has been a recurring issue.    ## SLEEP DISORDER:   Evaluated difficulty sleeping due to binge-watching TV, which is disrupting the patient's sleep routine.   Advised establishing an earlier bedtime routine to improve sleep habits.    ## WEIGHT GAIN:   Monitored weight increase from 231 to 249 lbs, attributable to sedentary lifestyle after job change.   Recommend implementation of weight loss strategies through increased physical activity (particularly to counteract sedentary work routine), reduced sugar intake, and increased consumption of lean proteins like chicken and fish.   Also advised decreased fat intake, especially from fried foods and red meat.    ## HISTORY OF COLON POLYPS:   Monitored history of multiple polyps removed during colonoscopy in September 2023.   Assessed the need for ongoing surveillance due to this history.   Scheduled repeat colonoscopy in 2 years for continued monitoring.    ## FAMILY HISTORY OF HYPERTENSION:   Assessed family history of high blood pressure, with mother having the condition.    ## PREVENTIVE CARE:   Administered Prevnar 20 pneumonia vaccine in office.    ## FOLLOW-UP:   Scheduled follow up in 6 months to recheck weight, BP, cholesterol, and hemoglobin A1C with labs prior to appointment.         Follow up in about 6 months (around 1/31/2026).     Flavio Colunga MD  Answers submitted by the patient for this visit:  Review of Systems Questionnaire (Submitted on  7/28/2025)  activity change: Yes  unexpected weight change: Yes  neck pain: No  hearing loss: No  rhinorrhea: No  trouble swallowing: No  eye discharge: No  visual disturbance: No  chest tightness: No  wheezing: No  chest pain: No  palpitations: No  blood in stool: No  constipation: No  vomiting: No  diarrhea: No  polydipsia: No  polyuria: No  difficulty urinating: No  urgency: No  hematuria: No  joint swelling: No  arthralgias: No  headaches: No  weakness: No  confusion: No  dysphoric mood: No

## 2025-08-05 ENCOUNTER — CLINICAL SUPPORT (OUTPATIENT)
Dept: ENDOSCOPY | Facility: HOSPITAL | Age: 60
End: 2025-08-05
Attending: INTERNAL MEDICINE
Payer: COMMERCIAL

## 2025-08-05 VITALS — WEIGHT: 249 LBS | HEIGHT: 70 IN | BODY MASS INDEX: 35.65 KG/M2

## 2025-08-05 DIAGNOSIS — Z86.0100 HX OF COLONIC POLYPS: ICD-10-CM

## 2025-08-05 DIAGNOSIS — Z12.11 COLON CANCER SCREENING: ICD-10-CM

## 2025-08-05 NOTE — PATIENT INSTRUCTIONS
.  [facility] Prep and Procedure Instructions  Polyethylene Glycol (PEG) Solution    Date of procedure: 9/19/25 - Arrive at 7:30 AM  Location of Department:   [facility] [address] [place] [address]  Take the [facility] Elevators to 4th Floor Endoscopy Lab    Getting ready for your procedure:    If your procedure requires the administration of anesthesia, it is necessary for a responsible adult to drive you home. The designated adult is strongly encouraged to remain in the endoscopy area until the patient is discharged. (Medical Transportation, Uber, Lyft, Taxi, etc. may ONLY be used if a responsible adult is present to accompany you home. The responsible adult CANNOT be the  of the service.)   person must be available to return to pick you up within 15 minutes of being notified of discharge.  Please bring a picture ID, insurance card, & co-pay.  Leave all jewelry and valuables at home on the day of the procedure.  Do not follow the instructions that come in the prep box - use these instructions instead.    Medications:    If you begin taking any new blood thinning, weight loss, or diabetic medications please call Endoscopy Scheduling as soon as possible at [phone].  If you take heart, blood pressure, seizure, pain, (including inhalers/nebulizers), anti-rejection (transplant patients) or mental health medications, please take at your regular times with a sip of water.  If you are taking diabetic or weight loss medications, see the attached instructions.      COLONOSCOPY PREP TIMELINE    ONE WEEK PRIOR TO PROCEDURE   the prep kit and Dulcolax laxative tablets (bisacodyl 5mg - available over the counter) from your local pharmacy.  Purchase clear liquids for use during the prep referencing the chart below.    CLEAR LIQUIDS NOT CLEAR LIQUIDS (DO NOT DRINK)   Water X Milk   Clear broth (chicken, beef, or vegetable) X Smoothies   Apple Juice (no pulp) X Hot  Chocolate   White grape juice X Juices with pulp (orange juice, prune juice)   Sports drinks (AVOID red, purple, or blue) X Coffee WITH cream or milk   Clear soda X Beverages with particles (shakes, milkshakes)   Tea or coffee (black, NO MILK)    Clear gelatin (Jell-o, AVOID red, purple, or blue)      THE DAY BEFORE YOUR PROCEDURE: 9/18/25    Consume ONLY CLEAR LIQUIDS for the entire day.  Stay hydrated.  NO SOLID FOOD    2:00 PM  Mix the PEG solution as directed on container and place in the refrigerator.  Take four (4) Dulcolax laxative (bisacodyl 5mg) tablets with at least one full glass of clear liquids.      6:00 - 7:00 PM  Drink half the liquid in the container within one (1) hour.  Drink an 8-ounce glass of the mixture every 10 to 20 minutes until half is gone.  Refrigerate the remaining half of the liquid for dose 2.   If you experience nausea, bloating, or cramping, slow down drinking until your symptoms decrease.    THE DAY OF YOUR PROCEDURE: 9/19/25    2:00 - 3:00 AM  Drink remaining half of the liquid in the container within one (1) hour.  Drink an 8-ounce glass of the mixture every 10 to 20 minutes.    ** May continue to drink clear liquids until 4 hours prior to arrival time for procedure **    Who to call if you have questions:    To reschedule / cancel, or for prep questions: [phone] / Hours of operation Monday-Friday 8:00 AM-4:30 PM  Insurance or financial information: [phone] or [phone]  After hours concerns, call [facility] at [phone] or [phone]    Please watch this informational video:  [email or web address]      .  Please contact your Diabetes Care Provider if you have questions or are preparing for more than one day before your procedure. For any scheduling questions, contact the Endoscopy Schedulers at [phone]. Please disregard this guide if you do not take any of these medications.     Weight loss and Diabetes  Medication Holding Instructions:     Oral Medicine Day before procedure Day of Procedure   Glyburide Do NOT take Do NOT take morning of procedure. If you  take twice daily, take with dinner.   Glucotrol (Glipizide)     Amaryl (Glimepiride)        Glucophage Take as  prescribed Do NOT take morning of procedure. Take  when you start eating again.   Glumetza     Fortamet (Metformin)        Januvia (Sitaglipitin) Take as  prescribed Do NOT take morning of procedure. Take  when you start eating again.   Nesina (Aloglipitin)     Onglyza (Saxaglipitin)     Tradjenta (Linaglipitin)        Invokana (canagliflozin) see prep   instructions  for date of  last dose Do NOT take morning of procedure. Take  when you start eating again.   Farxiga (dapagliflozin)     Jardiance (empagliflozin)     Steglatro (ertugliflozin)     Brenzavvy (bexagliflozin)     Inpefa (sotagliflozin)     Invokamet/Invokamet XR (Invokana + metformin)     Xigduo (Farxiga + metformin)     Synjardy  XR (Jardiance + metformin)     Segluromet (Steglatro + metformin)     Qtern (Farxiga + saxagliptin)     Glyxambi (Jardiance + linagliptin)     Trijardy XR (empagliflozin + linagliptin + metformin)     Steglujan (Steglatro + sitagliptin)        Actos (Pioglitazone) Take as  prescribed. Do NOT take morning of procedure. Take  when you start eating again.            Rybelsus (semaglutide) Take as   prescribed. Do NOT take morning of procedure. Take   when you start eating again.             Adipex/Lomaria (phentermine) see prep  instructions  for date of  last dose Do NOT take morning of procedure. Take  when you start eating again.   Qsymia (phentermine  + topiramate)              Tenuate/Tepanil (diethylpropion) see prep  instructions  for date of last dose Do NOT take morning of procedure. Take  when you start eating again.                  Xenical (orilstat) see prep   instructions  for date of last dose Do NOT take morning of procedure. Take  when you start  eating again.                  Injectable & Combination  Medicine (taken daily) Day before Procedure Day of Procedure   Victoza/Saxenda (liragluide) Take as prescribed Do NOT take morning of procedure. Take  when you start eating again.   Byetta (exenatide)     Gattex (teduglutide)     Adlyxin (lixisenatide) *     Soliqua (lixisenatide + insulin glargine)     Xultophy (liraglutide + insulin degludec)        Injectable  Medicine  (taken weekly) Day before Procedure Day of Procedure   Byalma delia adame (exenatide ER) see prep  instructions  for date of  last dose Do NOT take morning of procedure. Take   when you start eating again.   Mounjaro/Zepbound (tirzepatide)     Ozempic/Wegovy (semaglutide)     Tanzeum (albiglutide) *     Trulicity (dulaglutide)     It is important to monitor your blood sugar while doing the bowel preparation. On the day of your bowel prep, when you are on a clear liquid diet, you may drink beverages with sugar as your source of glucose. Be sure to mix the prep with water or sugar free liquid only. Below are instructions on how to adjust your diabetic medications prior to your scheduled procedure. Call the healthcare provider who manages your diabetes if you have questions.   Insulin for Type 1 Diabetes Mellitus Day before Procedure                                         Day of procedure    Basaglar                   If you use in the morning, take as prescribed.  If you use in the evening, inject 70% of dose. If you take in the morning,   inject 80% of dose. If you take  in the evenings, inject usual   dose.    Lantus            Levemir     Martín Lance                         Count carbs and adjust dose accordingly. If not carb counting, take 25% of usual meal dose. May use correction dose every 4 hours. Do NOT use once sugar-free liquid prep is started.      Do NOT take morining of procedure.        Take when you start eating again.    Apidra                            Humalog 100                           Humalog 200                            Novolog                                                Insulin Pump                     SEE INSULIN PUMP INSTRUCTIONS      Insulin for Type 2 Diabetes Mellitus Day before Procedure                                         Day of procedure    Basaglar                         If you use in the morning, take as prescribed.   If you use in the evening, inject 70% of dose. If you take in the morning,   inject 80% of dose. If you take  in the evenings, inject usual   dose.    Lantus                             Levemir     Toujeo     Tataba        Afredantea                         Inject 50 % of dose with clear liquid diet.   Do NOT use once sugar-free clear liquid prep is started. If you are using a correction scale, take dose every 4 hours as needed. Do NOT take morning of   procedure. Take when you   start eating meals again.    Apidra                            Fiasp                    Humalog 100                        Humalog 200     Novolog        NPH                          Inject 50% of breakfast and dinner doses with clear liquid diet.  Do NOT take morning of  procedure. Take usual dose  when you eat dinner.                                        Regular       Inject 50% of breakfast dose and do NOT takedinner dose once sugar-free liquid prep has started. If using correction scale, may take dose every 6 hours as needed.  Do NOT take morning of   procedure. Take usual dose   when you eat dinner.                                                           Novolog Mix 70/30                       Inject 50% of breakfast dose. Inject 25% of dinner dose. Do NOT take breakfast dose.   Take usual dose when you eat   dinner.    Humolog Mix 75/25                            Humolog Mix 50/50        U500                        Take 50% of AM or breakfast unit nehemiah dose.Take 25% of lunch or dinner or PM unit nehemiah dose.  Do NOT take mornining of  "  procedure. Take when you   start eating again.                                 V-Go                        Continue to wear your V-Go device. Do NOT click once sugar-free clear liquid prep is started.  Continue to wear your V-Go  device. Resume clicks with   meals.                                    INSULIN PUMP GUIDANCE Day before Procedure Day of Procedure   Pump and CGM do not communicate      Count carbs and bolus for carbs and correction as needed. May use temp basal rate of 70% once sugar-free clear prep is started. Continue until after procedure the following day Continue use of 70% temp basal rate until procedure complete and you are eating normally        Pump and CGM do communicate     Tandem Tslim and Mobi Count carbs and bolus for carbs and correction as needed. Start "Exercise" mode once sugar-free clear prep is started. Continue until after procedure the following day Continue use of "Exercise" mode until procedure complete and you are eating normally        Omnipod 5 Count carbs and bolus for carbs and correction as needed. Start "Activity" mode once sugar-free clear prep is started. Continue until after procedure the following day Continue use of "Activity" mode until procedure complete and you are eating normally        B2B-Center 670g/770g/780g Count carbs and bolus for carbs and correction as needed. Start "Temp target" mode once sugar-free clear prep is started. Continue until after procedure the following day Continue use of temp target until procedure complete and you are eating normally        Beta Bionic ilet Count carbs and bolus for carbs and correction as needed. Let pump run as you usually would Let pump run as you usually would       "